# Patient Record
Sex: MALE | Race: WHITE | NOT HISPANIC OR LATINO | Employment: FULL TIME | URBAN - METROPOLITAN AREA
[De-identification: names, ages, dates, MRNs, and addresses within clinical notes are randomized per-mention and may not be internally consistent; named-entity substitution may affect disease eponyms.]

---

## 2017-11-09 ENCOUNTER — HISTORICAL (OUTPATIENT)
Dept: RADIOLOGY | Facility: HOSPITAL | Age: 52
End: 2017-11-09

## 2017-11-09 LAB
HBV SURFACE AG SERPL QL IA: NEGATIVE
HCV AB SERPL QL IA: NEGATIVE

## 2020-01-17 LAB — CRC RECOMMENDATION EXT: NORMAL

## 2021-09-03 LAB — TSH SERPL-ACNC: 1.24 MIU/ML (ref 0.45–4.5)

## 2021-10-28 LAB
BILIRUB SERPL-MCNC: NEGATIVE MG/DL
BLOOD URINE, POC: NEGATIVE
CLARITY, POC UA: CLEAR
COLOR, POC UA: YELLOW
GLUCOSE UR QL STRIP: NEGATIVE
KETONES UR QL STRIP: NEGATIVE
LEUKOCYTE EST, POC UA: NEGATIVE
NITRITE, POC UA: NEGATIVE
PH, POC UA: 7.5
PROTEIN, POC: NEGATIVE
SPECIFIC GRAVITY, POC UA: 1.01
UROBILINOGEN, POC UA: NORMAL

## 2022-04-12 ENCOUNTER — HISTORICAL (OUTPATIENT)
Dept: ADMINISTRATIVE | Facility: HOSPITAL | Age: 57
End: 2022-04-12
Payer: COMMERCIAL

## 2022-04-30 VITALS
SYSTOLIC BLOOD PRESSURE: 122 MMHG | BODY MASS INDEX: 26.02 KG/M2 | HEIGHT: 67 IN | DIASTOLIC BLOOD PRESSURE: 72 MMHG | OXYGEN SATURATION: 99 % | WEIGHT: 165.81 LBS

## 2022-05-09 ENCOUNTER — HISTORICAL (OUTPATIENT)
Dept: ADMINISTRATIVE | Facility: HOSPITAL | Age: 57
End: 2022-05-09
Payer: COMMERCIAL

## 2022-05-11 RX ORDER — ALPRAZOLAM 0.5 MG/1
0.25 TABLET ORAL DAILY PRN
Qty: 30 TABLET | Refills: 0 | Status: SHIPPED | OUTPATIENT
Start: 2022-05-11 | End: 2022-11-03 | Stop reason: SDUPTHER

## 2022-05-11 RX ORDER — ALPRAZOLAM 0.5 MG/1
0.25 TABLET ORAL DAILY PRN
COMMUNITY
End: 2022-05-11 | Stop reason: SDUPTHER

## 2022-05-11 NOTE — TELEPHONE ENCOUNTER
----- Message from Ino Crook sent at 5/11/2022  2:40 PM CDT -----  Type: RX Refill Request    Who Called: Patient    Refill or New Rx:Refill    RX Name and Strength:alprazolam 0.5    How is the patient currently taking it? (ex. 1XDay):PRN    Is this a 30 day or 90 day RX:90 days    Preferred Pharmacy with phone number:[facility] [address] [facility]    Local or Mail Order:local    Ordering Provider:Ann Marie    Would the patient rather a call back or a response via [facility]? call    Best Call Back Number:[phone]    Additional Information:

## 2022-08-16 ENCOUNTER — DOCUMENTATION ONLY (OUTPATIENT)
Dept: ADMINISTRATIVE | Facility: HOSPITAL | Age: 57
End: 2022-08-16
Payer: COMMERCIAL

## 2022-09-22 ENCOUNTER — HISTORICAL (OUTPATIENT)
Dept: ADMINISTRATIVE | Facility: HOSPITAL | Age: 57
End: 2022-09-22
Payer: COMMERCIAL

## 2022-10-13 ENCOUNTER — TELEPHONE (OUTPATIENT)
Dept: FAMILY MEDICINE | Facility: CLINIC | Age: 57
End: 2022-10-13
Payer: COMMERCIAL

## 2022-10-13 NOTE — TELEPHONE ENCOUNTER
Spoke with the patient about medication refill. Informed him that he should have over a month left due to it being a 90 day Rx and last fill should have been in August. Pt checked his bottle and states that he filled it on 8/27/22 and that it says 90, but that he only had 7 tablets left. Informed the patient that I would contact the pharmacy to see what happened.   Spoke with pharmacy tech at [facility] and she checked their cameras and notified me that they did short the patient and that they would contact him to notify him to go  the remaining Rx.   Attempted to call the pt to notify him. No answer. Left  informing his of this.

## 2022-10-13 NOTE — TELEPHONE ENCOUNTER
----- Message from Sia Joseph sent at 10/13/2022  4:30 PM CDT -----  Regarding: refill  Type:  RX Refill Request    Who Called: pt  Refill or New Rx:refill  RX Name and Strength:EScitalopram oxalate (LEXAPRO) 5 MG Tab  How is the patient currently taking it? (ex. 1XDay):1xday  Is this a 30 day or 90 day RX:90  Preferred Pharmacy with phone number:[facility]  Local or Mail Order:local  Ordering Provider:Elyse Jesus  Would the patient rather a call back or a response via [facility]?   Best Call Back Number:[phone]  Additional Information:

## 2022-10-26 ENCOUNTER — TELEPHONE (OUTPATIENT)
Dept: FAMILY MEDICINE | Facility: CLINIC | Age: 57
End: 2022-10-26
Payer: COMMERCIAL

## 2022-10-26 DIAGNOSIS — Z00.00 ANNUAL PHYSICAL EXAM: Primary | ICD-10-CM

## 2022-10-26 DIAGNOSIS — Z12.5 PROSTATE CANCER SCREENING: ICD-10-CM

## 2022-10-26 DIAGNOSIS — Z11.59 NEED FOR HEPATITIS C SCREENING TEST: ICD-10-CM

## 2022-10-26 DIAGNOSIS — Z11.4 SCREENING FOR HIV (HUMAN IMMUNODEFICIENCY VIRUS): ICD-10-CM

## 2022-11-03 ENCOUNTER — OFFICE VISIT (OUTPATIENT)
Dept: FAMILY MEDICINE | Facility: CLINIC | Age: 57
End: 2022-11-03
Payer: COMMERCIAL

## 2022-11-03 VITALS
DIASTOLIC BLOOD PRESSURE: 86 MMHG | HEIGHT: 67 IN | RESPIRATION RATE: 16 BRPM | TEMPERATURE: 98 F | OXYGEN SATURATION: 98 % | WEIGHT: 166.81 LBS | BODY MASS INDEX: 26.18 KG/M2 | HEART RATE: 78 BPM | SYSTOLIC BLOOD PRESSURE: 128 MMHG

## 2022-11-03 DIAGNOSIS — E78.5 HYPERLIPIDEMIA, UNSPECIFIED HYPERLIPIDEMIA TYPE: ICD-10-CM

## 2022-11-03 DIAGNOSIS — I27.21 SECONDARY PULMONARY ARTERIAL HYPERTENSION: ICD-10-CM

## 2022-11-03 DIAGNOSIS — E29.1 HYPOGONADISM IN MALE: ICD-10-CM

## 2022-11-03 DIAGNOSIS — Z12.5 PROSTATE CANCER SCREENING: ICD-10-CM

## 2022-11-03 DIAGNOSIS — I10 PRIMARY HYPERTENSION: ICD-10-CM

## 2022-11-03 DIAGNOSIS — F41.1 ANXIETY STATE: ICD-10-CM

## 2022-11-03 DIAGNOSIS — G44.219 EPISODIC TENSION-TYPE HEADACHE, NOT INTRACTABLE: ICD-10-CM

## 2022-11-03 DIAGNOSIS — Z00.00 ANNUAL PHYSICAL EXAM: Primary | ICD-10-CM

## 2022-11-03 PROBLEM — K21.9 GASTROESOPHAGEAL REFLUX DISEASE: Status: ACTIVE | Noted: 2022-11-03

## 2022-11-03 PROCEDURE — 3079F DIAST BP 80-89 MM HG: CPT | Mod: CPTII,,, | Performed by: NURSE PRACTITIONER

## 2022-11-03 PROCEDURE — 99396 PR PREVENTIVE VISIT,EST,40-64: ICD-10-PCS | Mod: ,,, | Performed by: NURSE PRACTITIONER

## 2022-11-03 PROCEDURE — 4010F ACE/ARB THERAPY RXD/TAKEN: CPT | Mod: CPTII,,, | Performed by: NURSE PRACTITIONER

## 2022-11-03 PROCEDURE — 3079F PR MOST RECENT DIASTOLIC BLOOD PRESSURE 80-89 MM HG: ICD-10-PCS | Mod: CPTII,,, | Performed by: NURSE PRACTITIONER

## 2022-11-03 PROCEDURE — 1160F PR REVIEW ALL MEDS BY PRESCRIBER/CLIN PHARMACIST DOCUMENTED: ICD-10-PCS | Mod: CPTII,,, | Performed by: NURSE PRACTITIONER

## 2022-11-03 PROCEDURE — 99396 PREV VISIT EST AGE 40-64: CPT | Mod: ,,, | Performed by: NURSE PRACTITIONER

## 2022-11-03 PROCEDURE — 3008F PR BODY MASS INDEX (BMI) DOCUMENTED: ICD-10-PCS | Mod: CPTII,,, | Performed by: NURSE PRACTITIONER

## 2022-11-03 PROCEDURE — 4010F PR ACE/ARB THEARPY RXD/TAKEN: ICD-10-PCS | Mod: CPTII,,, | Performed by: NURSE PRACTITIONER

## 2022-11-03 PROCEDURE — 3074F PR MOST RECENT SYSTOLIC BLOOD PRESSURE < 130 MM HG: ICD-10-PCS | Mod: CPTII,,, | Performed by: NURSE PRACTITIONER

## 2022-11-03 PROCEDURE — 3008F BODY MASS INDEX DOCD: CPT | Mod: CPTII,,, | Performed by: NURSE PRACTITIONER

## 2022-11-03 PROCEDURE — 1159F MED LIST DOCD IN RCRD: CPT | Mod: CPTII,,, | Performed by: NURSE PRACTITIONER

## 2022-11-03 PROCEDURE — 1160F RVW MEDS BY RX/DR IN RCRD: CPT | Mod: CPTII,,, | Performed by: NURSE PRACTITIONER

## 2022-11-03 PROCEDURE — 1159F PR MEDICATION LIST DOCUMENTED IN MEDICAL RECORD: ICD-10-PCS | Mod: CPTII,,, | Performed by: NURSE PRACTITIONER

## 2022-11-03 PROCEDURE — 3074F SYST BP LT 130 MM HG: CPT | Mod: CPTII,,, | Performed by: NURSE PRACTITIONER

## 2022-11-03 RX ORDER — TESTOSTERONE CYPIONATE 200 MG/ML
INJECTION, SOLUTION INTRAMUSCULAR
COMMUNITY
Start: 2022-10-11

## 2022-11-03 RX ORDER — ALPRAZOLAM 0.5 MG/1
0.25 TABLET ORAL DAILY PRN
Qty: 30 TABLET | Refills: 2 | Status: SHIPPED | OUTPATIENT
Start: 2022-11-03

## 2022-11-03 RX ORDER — ALBUTEROL SULFATE 90 UG/1
2 AEROSOL, METERED RESPIRATORY (INHALATION) EVERY 6 HOURS PRN
COMMUNITY

## 2022-11-03 RX ORDER — BUTALBITAL, ACETAMINOPHEN AND CAFFEINE 50; 325; 40 MG/1; MG/1; MG/1
TABLET ORAL
COMMUNITY
Start: 2021-11-22 | End: 2022-11-03 | Stop reason: SDUPTHER

## 2022-11-03 RX ORDER — LOSARTAN POTASSIUM 50 MG/1
50 TABLET ORAL DAILY
Qty: 90 TABLET | Refills: 3 | Status: SHIPPED | OUTPATIENT
Start: 2022-11-03 | End: 2023-05-03

## 2022-11-03 RX ORDER — BUTALBITAL, ACETAMINOPHEN AND CAFFEINE 50; 325; 40 MG/1; MG/1; MG/1
1 TABLET ORAL EVERY 6 HOURS PRN
Qty: 60 TABLET | Refills: 1 | Status: SHIPPED | OUTPATIENT
Start: 2022-11-03

## 2022-11-03 NOTE — PROGRESS NOTES
Subjective:       Patient ID: Kt Guzman is a 56 y.o. male.    Chief Complaint: Annual Exam      HPI   This is a 56-year-old white male who presents to clinic today for annual wellness exam.  Patient has a history of tension headaches, anxiety, hyperlipidemia, hypertension, secondary pulmonary arterial hypertension, male hypogonadism, GERD.  Patient states overall he is doing well.  Does admit some increased anxiety but wife states that on the days he feels anxious she notices that he forgot to take his Lexapro.  He takes his Lexapro lunchtime in his blood pressure medication in the evening because he thought he could not take them together.   Review of Systems  Comprehensive review of systems negative except as stated in HPI    The patient's Health Maintenance was reviewed and the following appears to be due:   Health Maintenance Due   Topic Date Due    PROSTATE-SPECIFIC ANTIGEN  Never done    HIV Screening  Never done    TETANUS VACCINE  Never done    Shingles Vaccine (1 of 2) Never done       Past Medical History:  Past Medical History:   Diagnosis Date    Anxiety disorder, unspecified     GERD (gastroesophageal reflux disease)     Hyperlipidemia     Hypertensive disorder     Male hypogonadism      Past Surgical History:   Procedure Laterality Date    COLONOSCOPY  2020    Dr. Hernandez    FACIAL RECONSTRUCTION SURGERY       Review of patient's allergies indicates:   Allergen Reactions    Phenytoin sodium extended Hives     Current Outpatient Medications on File Prior to Visit   Medication Sig Dispense Refill    albuterol (PROVENTIL/VENTOLIN HFA) 90 mcg/actuation inhaler Inhale 2 puffs into the lungs every 6 (six) hours as needed.      EScitalopram oxalate (LEXAPRO) 5 MG Tab TAKE 1 TABLET BY MOUTH EVERY DAY 90 tablet 1    testosterone cypionate (DEPOTESTOTERONE CYPIONATE) 200 mg/mL injection SMARTSI.5 Milliliter(s) IM Once a Week      [DISCONTINUED] ALPRAZolam (XANAX) 0.5 MG tablet Take 0.5 tablets (0.25  "mg total) by mouth daily as needed for Anxiety. 30 tablet 0    [DISCONTINUED] butalbital-acetaminophen-caffeine -40 mg (FIORICET, ESGIC) -40 mg per tablet Take by mouth.      [DISCONTINUED] losartan (COZAAR) 25 MG tablet TAKE 1 TABLET BY MOUTH EVERY DAY 90 tablet 1     No current facility-administered medications on file prior to visit.     Social History     Socioeconomic History    Marital status:    Tobacco Use    Smoking status: Never    Smokeless tobacco: Never   Substance and Sexual Activity    Alcohol use: Yes     Comment: occassional    Drug use: Never    Sexual activity: Yes     Partners: Female     Birth control/protection: None     Family History   Problem Relation Age of Onset    Heart block Mother     Heart block Father     Prostate cancer Father     Valvular heart disease Father        Objective:       /86 (BP Location: Left arm)   Pulse 78   Temp 98.3 °F (36.8 °C) (Oral)   Resp 16   Ht 5' 7" (1.702 m)   Wt 75.7 kg (166 lb 12.8 oz)   SpO2 98%   BMI 26.12 kg/m²      Physical Exam  Vitals and nursing note reviewed.   Constitutional:       Appearance: Normal appearance. He is normal weight.   HENT:      Head: Normocephalic and atraumatic.      Right Ear: Tympanic membrane, ear canal and external ear normal.      Left Ear: Tympanic membrane, ear canal and external ear normal.      Nose: Nose normal.      Mouth/Throat:      Mouth: Mucous membranes are moist.      Pharynx: Oropharynx is clear.   Eyes:      Extraocular Movements: Extraocular movements intact.      Conjunctiva/sclera: Conjunctivae normal.      Pupils: Pupils are equal, round, and reactive to light.   Cardiovascular:      Rate and Rhythm: Normal rate and regular rhythm.      Pulses: Normal pulses.      Heart sounds: Normal heart sounds.   Pulmonary:      Effort: Pulmonary effort is normal.      Breath sounds: Normal breath sounds.   Abdominal:      General: Abdomen is flat. Bowel sounds are normal.      " Palpations: Abdomen is soft.   Musculoskeletal:         General: Normal range of motion.      Cervical back: Normal range of motion and neck supple.   Skin:     General: Skin is warm and dry.   Neurological:      General: No focal deficit present.      Mental Status: He is alert and oriented to person, place, and time.   Psychiatric:         Mood and Affect: Mood normal.         Behavior: Behavior normal.         Thought Content: Thought content normal.         Judgment: Judgment normal.       Labs  Historical on 09/22/2022   Component Date Value Ref Range Status    Glucose, UA 10/28/2021 Negative   Final    Bilirubin, POC 10/28/2021 Negative   Final    Ketones, UA 10/28/2021 Negative   Final    Color, UA 10/28/2021 Yellow   Final    Clarity, UA 10/28/2021 Clear   Final    Spec Grav UA 10/28/2021 1.015   Final    Blood, UA 10/28/2021 Negative   Final    pH, UA 10/28/2021 7.5   Final    Protein, POC 10/28/2021 Negative   Final    Urobilinogen, UA 10/28/2021 0.2 mg/dl   Final    Nitrite, UA 10/28/2021 Negative   Final    Leukocytes, UA 10/28/2021 Negative   Final   Documentation Only on 08/16/2022   Component Date Value Ref Range Status    CRC Recommendation External 01/17/2020 Repeat colonoscopy in 5 years   Final   Historical on 05/09/2022   Component Date Value Ref Range Status    Thyroid Stimulating Hormone 09/03/2021 1.240  0.450 - 4.500 mIU/mL Final       Assessment and Plan       ICD-10-CM ICD-9-CM   1. Annual physical exam  Z00.00 V70.0   2. Prostate cancer screening  Z12.5 V76.44   3. Primary hypertension  I10 401.9   4. Anxiety state  F41.1 300.00   5. Secondary pulmonary arterial hypertension  I27.21 416.8   6. Episodic tension-type headache, not intractable  G44.219 339.11   7. Hyperlipidemia, unspecified hyperlipidemia type  E78.5 272.4   8. Hypogonadism in male  E29.1 257.2        1. Annual physical exam  Overview:  Annual exam yearly in November      2. Prostate cancer screening  Overview:  Followed by  Dr. Crook    Assessment & Plan:  Recent labs done with Dr. Crook, records requested      3. Primary hypertension  Overview:  Losartan 50 mg daily     Assessment & Plan:  Will increase losartan to 50 mg daily and follow-up in 6 months.    Orders:  -     losartan (COZAAR) 50 MG tablet; Take 1 tablet (50 mg total) by mouth once daily.  Dispense: 90 tablet; Refill: 3    4. Anxiety state  Overview:  Lexapro 5 mg daily  Xanax 0.5 mg 1/2-1 tablet daily as needed    Assessment & Plan:  Discussed maybe increasing his Lexapro to 10 mg daily but patient does think he has been forgetting to take his Lexapro some days.  He will start taking it at night with his blood pressure medication to increase compliance.  Will let me know if he wants to increase in a few weeks.    Orders:  -     ALPRAZolam (XANAX) 0.5 MG tablet; Take 0.5 tablets (0.25 mg total) by mouth daily as needed for Anxiety.  Dispense: 30 tablet; Refill: 2    5. Secondary pulmonary arterial hypertension  Overview:  Dr May    Assessment & Plan:  Saw Dr May in 2020, had echocardiogram. Records requested.       6. Episodic tension-type headache, not intractable  Overview:  Fioricet as needed    Assessment & Plan:  Stable, continue Fioricet, follow-up 6 months.    Orders:  -     butalbital-acetaminophen-caffeine -40 mg (FIORICET, ESGIC) -40 mg per tablet; Take 1 tablet by mouth every 6 (six) hours as needed for Headaches.  Dispense: 60 tablet; Refill: 1    7. Hyperlipidemia, unspecified hyperlipidemia type  Overview:  Diet controlled     Assessment & Plan:  Recent labs with Dr Crook, records requested      8. Hypogonadism in male  Overview:  Managed by Dr. Crook, on Depo testosterone 200 mg/ml 0.5 mL IM weekly    Assessment & Plan:  Stable, managed by Dr. Crook, recent records and labs requested.             Follow up in about 6 months (around 5/3/2023) for follow up.

## 2022-11-03 NOTE — ASSESSMENT & PLAN NOTE
Discussed maybe increasing his Lexapro to 10 mg daily but patient does think he has been forgetting to take his Lexapro some days.  He will start taking it at night with his blood pressure medication to increase compliance.  Will let me know if he wants to increase in a few weeks.

## 2023-02-06 PROBLEM — Z00.00 ANNUAL PHYSICAL EXAM: Status: RESOLVED | Noted: 2022-11-03 | Resolved: 2023-02-06

## 2023-04-26 ENCOUNTER — TELEPHONE (OUTPATIENT)
Dept: FAMILY MEDICINE | Facility: CLINIC | Age: 58
End: 2023-04-26
Payer: COMMERCIAL

## 2023-04-26 NOTE — LETTER
AUTHORIZATION FOR RELEASE OF   CONFIDENTIAL INFORMATION    Dear Dr. Crook,    We are seeing Kt Guzman, date of birth 1965, in the clinic at [facility]. MU Yung is the patient's PCP. Kt Guzman has an outstanding lab/procedure at the time we reviewed his chart. In order to help keep his health information updated, he has authorized us to request the following medical record(s):        (  )  MAMMOGRAM                                      (  )  COLONOSCOPY      (  )  PAP SMEAR                                          ( x )  OUTSIDE LAB RESULTS     (  )  DEXA SCAN                                          (  )  EYE EXAM            (  )  FOOT EXAM                                          (  )  ENTIRE RECORD     (  )  OUTSIDE IMMUNIZATIONS                 ( x )  MOST RECENT OFFICE NOTES       Please fax records to [facility], Kathryn F Duplantis, FNP, [phone]     If you have any questions, please contact [phone]           Patient Name: Kt Guzman  : 1965  Patient Phone #: [phone]      Dental

## 2023-04-26 NOTE — PROGRESS NOTES
Subjective:       Patient ID: Kt Guzman is a 57 y.o. male.    Chief Complaint: Hypertension (6 month f/u) and Headache (6 month f/u/Wakes up with headaches most morning.)      HPI   This is a 57-year-old white male who presents to clinic today for six-month follow-up for hypertension, anxiety, hyperlipidemia, male hypogonadism, secondary pulmonary arterial hypertension, and valvular heart disease.  Patient states overall he is doing well.  States that when he was taking the full dose of increase blood pressure medication he was waking up with a headache every day, started taking a half a tablet again and no longer has headache  Review of Systems  Comprehensive review of systems negative except as stated in HPI    The patient's Health Maintenance was reviewed and the following appears to be due:   Health Maintenance Due   Topic Date Due    PROSTATE-SPECIFIC ANTIGEN  Never done    Hemoglobin A1c (Diabetic Prevention Screening)  Never done       Past Medical History:  Past Medical History:   Diagnosis Date    Anxiety disorder, unspecified     GERD (gastroesophageal reflux disease)     Hyperlipidemia     Hypertensive disorder     Male hypogonadism      Past Surgical History:   Procedure Laterality Date    COLONOSCOPY  2020    Dr. Hernandez    FACIAL RECONSTRUCTION SURGERY       Review of patient's allergies indicates:   Allergen Reactions    Phenytoin sodium extended Hives     Current Outpatient Medications on File Prior to Visit   Medication Sig Dispense Refill    albuterol (PROVENTIL/VENTOLIN HFA) 90 mcg/actuation inhaler Inhale 2 puffs into the lungs every 6 (six) hours as needed.      butalbital-acetaminophen-caffeine -40 mg (FIORICET, ESGIC) -40 mg per tablet Take 1 tablet by mouth every 6 (six) hours as needed for Headaches. 60 tablet 1    testosterone cypionate (DEPOTESTOTERONE CYPIONATE) 200 mg/mL injection SMARTSI.5 Milliliter(s) IM Once a Week      [DISCONTINUED] EScitalopram oxalate  (LEXAPRO) 5 MG Tab TAKE 1 TABLET BY MOUTH EVERY DAY 90 tablet 1    [DISCONTINUED] losartan (COZAAR) 50 MG tablet Take 1 tablet (50 mg total) by mouth once daily. 90 tablet 3    ALPRAZolam (XANAX) 0.5 MG tablet Take 0.5 tablets (0.25 mg total) by mouth daily as needed for Anxiety. 30 tablet 2     No current facility-administered medications on file prior to visit.     Social History     Socioeconomic History    Marital status:    Tobacco Use    Smoking status: Never    Smokeless tobacco: Never   Substance and Sexual Activity    Alcohol use: Yes     Comment: occassional    Drug use: Never    Sexual activity: Yes     Partners: Female     Birth control/protection: None     Social Determinants of Health     Financial Resource Strain: Unknown    Difficulty of Paying Living Expenses: Patient refused   Food Insecurity: Unknown    Worried About Running Out of Food in the Last Year: Patient refused    Ran Out of Food in the Last Year: Patient refused   Transportation Needs: Unknown    Lack of Transportation (Medical): Patient refused    Lack of Transportation (Non-Medical): Patient refused   Physical Activity: Unknown    Days of Exercise per Week: Patient refused    Minutes of Exercise per Session: Patient refused   Stress: Unknown    Feeling of Stress : Patient refused   Social Connections: Unknown    Frequency of Communication with Friends and Family: Patient refused    Frequency of Social Gatherings with Friends and Family: Patient refused    Attends Sikh Services: Patient refused    Active Member of Clubs or Organizations: Patient refused    Attends Club or Organization Meetings: Patient refused    Marital Status: Patient refused   Housing Stability: Unknown    Unable to Pay for Housing in the Last Year: Patient refused    Unstable Housing in the Last Year: Patient refused     Family History   Problem Relation Age of Onset    Heart block Mother     Heart block Father     Prostate cancer Father     Valvular  "heart disease Father        Objective:       /88 (BP Location: Left arm)   Pulse 60   Temp 98.5 °F (36.9 °C) (Oral)   Resp 16   Ht 5' 7" (1.702 m)   Wt 77.8 kg (171 lb 9.6 oz)   SpO2 98%   BMI 26.88 kg/m²      Physical Exam  Vitals and nursing note reviewed.   Constitutional:       Appearance: Normal appearance. He is normal weight.   HENT:      Head: Normocephalic and atraumatic.      Right Ear: Tympanic membrane, ear canal and external ear normal.      Left Ear: Tympanic membrane, ear canal and external ear normal.      Nose: Nose normal.      Mouth/Throat:      Mouth: Mucous membranes are moist.      Pharynx: Oropharynx is clear.   Eyes:      Extraocular Movements: Extraocular movements intact.      Conjunctiva/sclera: Conjunctivae normal.      Pupils: Pupils are equal, round, and reactive to light.   Cardiovascular:      Rate and Rhythm: Normal rate and regular rhythm.      Pulses: Normal pulses.      Heart sounds: Normal heart sounds.   Pulmonary:      Effort: Pulmonary effort is normal.      Breath sounds: Normal breath sounds.   Musculoskeletal:         General: Normal range of motion.      Cervical back: Normal range of motion and neck supple.   Skin:     General: Skin is warm and dry.   Neurological:      General: No focal deficit present.      Mental Status: He is alert and oriented to person, place, and time.   Psychiatric:         Mood and Affect: Mood normal.         Behavior: Behavior normal.         Thought Content: Thought content normal.         Judgment: Judgment normal.           Assessment and Plan       ICD-10-CM ICD-9-CM   1. Primary hypertension  I10 401.9   2. Anxiety state  F41.1 300.00   3. Hypogonadism in male  E29.1 257.2   4. Annual physical exam  Z00.00 V70.0   5. Prostate cancer screening  Z12.5 V76.44   6. Secondary pulmonary arterial hypertension  I27.21 416.8   7. Hyperlipidemia, unspecified hyperlipidemia type  E78.5 272.4   8. Nonrheumatic aortic valve insufficiency  " I35.1 424.1   9. Nonrheumatic tricuspid valve regurgitation  I36.1 424.2        1. Primary hypertension  Overview:  10/28/2021 - amlodipine 5 mg daily  10/29/2021 - DC amlodipine due to dizziness, start ramipril 5 mg daily  01/31/2022 - DC ramipril due to cough, start losartan 25 mg daily  11/03/2022 - losartan 50 mg daily  05/02/2023 - decrease losartan back to 25 mg due to headache, add HCTZ 12.5 mg daily      Assessment & Plan:  Blood pressure still a little high in clinic today, diastolic in the 80s and 90s at home as well.  Patient reports he is only taking a half of the losartan because when he takes a whole he has a headache in the morning.  Will decrease losartan back to 25 milligrams daily and add HCTZ 12.5 milligrams daily.  Instructed to continue to monitor blood pressure at home, follow-up 6 months.    Orders:  -     hydroCHLOROthiazide (HYDRODIURIL) 12.5 MG Tab; Take 1 tablet (12.5 mg total) by mouth once daily.  Dispense: 90 tablet; Refill: 3  -     losartan (COZAAR) 25 MG tablet; Take 1 tablet (25 mg total) by mouth once daily.  Dispense: 90 tablet; Refill: 3    2. Anxiety state  Overview:  Lexapro 5 mg daily  Xanax 0.5 mg 1/2-1 tablet daily as needed    Assessment & Plan:  Stable, continue Lexapro 5 mg daily and Xanax as needed.  Follow-up 6 months with wellness.    Orders:  -     EScitalopram oxalate (LEXAPRO) 5 MG Tab; Take 1 tablet (5 mg total) by mouth once daily.  Dispense: 90 tablet; Refill: 3    3. Hypogonadism in male  Overview:  Managed by Dr. Crook, on Depo testosterone 200 mg/ml 0.5 mL IM weekly    Assessment & Plan:  Most recent labs and office visit note requested.  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      4. Annual physical exam  Overview:  Annual exam yearly in November    Orders:  -     CBC Auto Differential; Future; Expected date: 11/03/2023  -     Comprehensive Metabolic Panel; Future; Expected date: 11/03/2023  -     Lipid Panel; Future; Expected date:  11/03/2023  -     TSH; Future; Expected date: 11/03/2023  -     Hemoglobin A1C ([facility] ONLY); Future; Expected date: 11/03/2023    5. Prostate cancer screening  Overview:  Followed by Dr. Crook    Assessment & Plan:  Most recent labs requested      6. Secondary pulmonary arterial hypertension  Overview:  Dr May    Assessment & Plan:  Encouraged patient to call Dr. May for follow-up.      7. Hyperlipidemia, unspecified hyperlipidemia type  Overview:  Diet controlled     Assessment & Plan:  Lipid panel in 6 months with wellness.      8. Nonrheumatic aortic valve insufficiency  Overview:  Dr May     Assessment & Plan:  Last office visit January 2020, strongly encouraged patient to follow back up with Dr. May, patient states he will call his office.      9. Nonrheumatic tricuspid valve regurgitation  Overview:  Dr May    Assessment & Plan:  Last office visit January 2020, strongly encouraged patient to follow back up with Dr. May, patient states he will call his office.             Follow up in 6 months (on 11/9/2023) for Annual.

## 2023-04-26 NOTE — TELEPHONE ENCOUNTER
Are there any outstanding tasks in patient's chart?  labs    2. Do we have outstanding/pending referrals?  n    3. Has the patient been seen in an ER, Urgent Care, or admitted since last visit?  n    4. Has patient seen any other health care providers since last visit?  n    5.  Has patient had any blood work or x-rays done since last visit?   N    No labs needed for visit- will complete at [facility]    Release sent to Dr. Crook

## 2023-05-03 ENCOUNTER — OFFICE VISIT (OUTPATIENT)
Dept: FAMILY MEDICINE | Facility: CLINIC | Age: 58
End: 2023-05-03
Payer: COMMERCIAL

## 2023-05-03 VITALS
HEIGHT: 67 IN | WEIGHT: 171.63 LBS | RESPIRATION RATE: 16 BRPM | TEMPERATURE: 99 F | HEART RATE: 60 BPM | SYSTOLIC BLOOD PRESSURE: 132 MMHG | BODY MASS INDEX: 26.94 KG/M2 | DIASTOLIC BLOOD PRESSURE: 88 MMHG | OXYGEN SATURATION: 98 %

## 2023-05-03 DIAGNOSIS — E29.1 HYPOGONADISM IN MALE: ICD-10-CM

## 2023-05-03 DIAGNOSIS — E78.5 HYPERLIPIDEMIA, UNSPECIFIED HYPERLIPIDEMIA TYPE: ICD-10-CM

## 2023-05-03 DIAGNOSIS — I10 PRIMARY HYPERTENSION: Primary | ICD-10-CM

## 2023-05-03 DIAGNOSIS — I27.21 SECONDARY PULMONARY ARTERIAL HYPERTENSION: ICD-10-CM

## 2023-05-03 DIAGNOSIS — F41.1 ANXIETY STATE: ICD-10-CM

## 2023-05-03 DIAGNOSIS — Z00.00 ANNUAL PHYSICAL EXAM: ICD-10-CM

## 2023-05-03 DIAGNOSIS — I35.1 NONRHEUMATIC AORTIC VALVE INSUFFICIENCY: ICD-10-CM

## 2023-05-03 DIAGNOSIS — I36.1 NONRHEUMATIC TRICUSPID VALVE REGURGITATION: ICD-10-CM

## 2023-05-03 DIAGNOSIS — Z12.5 PROSTATE CANCER SCREENING: ICD-10-CM

## 2023-05-03 PROCEDURE — 3079F PR MOST RECENT DIASTOLIC BLOOD PRESSURE 80-89 MM HG: ICD-10-PCS | Mod: CPTII,,, | Performed by: NURSE PRACTITIONER

## 2023-05-03 PROCEDURE — 3008F PR BODY MASS INDEX (BMI) DOCUMENTED: ICD-10-PCS | Mod: CPTII,,, | Performed by: NURSE PRACTITIONER

## 2023-05-03 PROCEDURE — 3075F PR MOST RECENT SYSTOLIC BLOOD PRESS GE 130-139MM HG: ICD-10-PCS | Mod: CPTII,,, | Performed by: NURSE PRACTITIONER

## 2023-05-03 PROCEDURE — 1159F PR MEDICATION LIST DOCUMENTED IN MEDICAL RECORD: ICD-10-PCS | Mod: CPTII,,, | Performed by: NURSE PRACTITIONER

## 2023-05-03 PROCEDURE — 4010F ACE/ARB THERAPY RXD/TAKEN: CPT | Mod: CPTII,,, | Performed by: NURSE PRACTITIONER

## 2023-05-03 PROCEDURE — 4010F PR ACE/ARB THEARPY RXD/TAKEN: ICD-10-PCS | Mod: CPTII,,, | Performed by: NURSE PRACTITIONER

## 2023-05-03 PROCEDURE — 3008F BODY MASS INDEX DOCD: CPT | Mod: CPTII,,, | Performed by: NURSE PRACTITIONER

## 2023-05-03 PROCEDURE — 99214 OFFICE O/P EST MOD 30 MIN: CPT | Mod: ,,, | Performed by: NURSE PRACTITIONER

## 2023-05-03 PROCEDURE — 1160F RVW MEDS BY RX/DR IN RCRD: CPT | Mod: CPTII,,, | Performed by: NURSE PRACTITIONER

## 2023-05-03 PROCEDURE — 99214 PR OFFICE/OUTPT VISIT, EST, LEVL IV, 30-39 MIN: ICD-10-PCS | Mod: ,,, | Performed by: NURSE PRACTITIONER

## 2023-05-03 PROCEDURE — 1160F PR REVIEW ALL MEDS BY PRESCRIBER/CLIN PHARMACIST DOCUMENTED: ICD-10-PCS | Mod: CPTII,,, | Performed by: NURSE PRACTITIONER

## 2023-05-03 PROCEDURE — 1159F MED LIST DOCD IN RCRD: CPT | Mod: CPTII,,, | Performed by: NURSE PRACTITIONER

## 2023-05-03 PROCEDURE — 3079F DIAST BP 80-89 MM HG: CPT | Mod: CPTII,,, | Performed by: NURSE PRACTITIONER

## 2023-05-03 PROCEDURE — 3075F SYST BP GE 130 - 139MM HG: CPT | Mod: CPTII,,, | Performed by: NURSE PRACTITIONER

## 2023-05-03 RX ORDER — LOSARTAN POTASSIUM 25 MG/1
25 TABLET ORAL DAILY
Qty: 90 TABLET | Refills: 3 | Status: SHIPPED | OUTPATIENT
Start: 2023-05-03 | End: 2023-11-09 | Stop reason: DRUGHIGH

## 2023-05-03 RX ORDER — ESCITALOPRAM OXALATE 5 MG/1
5 TABLET ORAL DAILY
Qty: 90 TABLET | Refills: 3 | Status: SHIPPED | OUTPATIENT
Start: 2023-05-03

## 2023-05-03 RX ORDER — HYDROCHLOROTHIAZIDE 12.5 MG/1
12.5 TABLET ORAL DAILY
Qty: 90 TABLET | Refills: 3 | Status: SHIPPED | OUTPATIENT
Start: 2023-05-03 | End: 2024-05-02

## 2023-05-03 NOTE — ASSESSMENT & PLAN NOTE
Last office visit January 2020, strongly encouraged patient to follow back up with Dr. May, patient states he will call his office.

## 2023-05-03 NOTE — ASSESSMENT & PLAN NOTE
Most recent labs and office visit note requested.  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   Adequate: hears normal conversation without difficulty

## 2023-05-03 NOTE — LETTER
AUTHORIZATION FOR RELEASE OF   CONFIDENTIAL INFORMATION    Dear Dr. Crook,    We are seeing Kt Guzman, date of birth 1965, in the clinic at [facility]. MU Yung is the patient's PCP. Kt Guzman has an outstanding lab/procedure at the time we reviewed his chart. In order to help keep his health information updated, he has authorized us to request the following medical record(s):        (  )  MAMMOGRAM                                      (  )  COLONOSCOPY      (  )  PAP SMEAR                                          (X) OUTSIDE LAB RESULTS (PAST YEAR)     (  )  DEXA SCAN                                          (  )  EYE EXAM            (  )  FOOT EXAM                                          (  )  ENTIRE RECORD     (  )  OUTSIDE IMMUNIZATIONS                 (  )  _______________         Please fax records to [facility], Kathryn F Duplantis, FNP, [phone]     If you have any questions, please contact us at [phone].        Patient Name: Kt Guzman  : 1965  Patient Phone #: [phone]

## 2023-05-03 NOTE — ASSESSMENT & PLAN NOTE
Blood pressure still a little high in clinic today, diastolic in the 80s and 90s at home as well.  Patient reports he is only taking a half of the losartan because when he takes a whole he has a headache in the morning.  Will decrease losartan back to 25 milligrams daily and add HCTZ 12.5 milligrams daily.  Instructed to continue to monitor blood pressure at home, follow-up 6 months.

## 2023-05-04 ENCOUNTER — DOCUMENTATION ONLY (OUTPATIENT)
Dept: FAMILY MEDICINE | Facility: CLINIC | Age: 58
End: 2023-05-04
Payer: COMMERCIAL

## 2023-05-04 LAB
% NEUTROPHILS (OHS): 47 %
ALBUMIN SERPL BCP-MCNC: 4.2 G/DL (ref 3.8–4.9)
ALBUMIN/GLOB SERPL ELPH: 1.7 {RATIO} (ref 1.2–2.2)
ALP SERPL-CCNC: 45 U/L (ref 44–121)
ALT SERPL W P-5'-P-CCNC: 21 U/L (ref 0–44)
AST SERPL-CCNC: 17 U/L (ref 0–40)
BASOPHILS NFR BLD: 0 % (ref 0–3)
BASOPHILS NFR BLD: 0 K/UL (ref 0–0.2)
BILIRUB SERPL-MCNC: 0.4 MG/DL (ref 0–1.2)
BUN BLD-MCNC: 19 MG/DL (ref 6–24)
BUN/CREAT RATIO: 19 (ref 9–20)
CALCIUM SERPL-MCNC: 9 MG/DL (ref 8.7–10.2)
CHLORIDE: 102 MMOL/L (ref 96–106)
CHOLEST SERPL-MSCNC: 169 MG/DL (ref 100–199)
CO2 SERPL-SCNC: 22 MMOL/L (ref 20–29)
CREAT SERPL-MCNC: 1 MG/DL (ref 0.8–1.3)
EGFR: 85 ML/MIN/1.73 (ref 59–?)
EOSINOPHIL NFR BLD: 0.1 K/UL (ref 0–0.4)
EOSINOPHIL NFR BLD: 2 %
GLOBULIN SER CALC-MCNC: 2.5 G/DL (ref 1.5–4.5)
GLUCOSE: 99 MG/DL (ref 70–99)
HCT VFR BLD AUTO: 47.6 % (ref 37.5–51)
HDLC SERPL-MCNC: 25 MG/DL (ref ?–39)
HGB BLD-MCNC: 16.1 G/DL (ref 13–17.7)
IMM GRANULOCYTES NFR BLD AUTO: 0 %
LDLC SERPL CALC-MCNC: 106 MG/DL (ref 0–99)
LYMPH #: 3.1 K/UL (ref 0.7–3.1)
LYMPH%: 42 % (ref 18–52)
MCH RBC QN AUTO: 31.4 PG (ref 26.6–33)
MCHC RBC AUTO-ENTMCNC: 33.8 G/DL (ref 31.5–35.7)
MCV RBC AUTO: 93 FL (ref 79–97)
MONO #: 0.7 K/UL (ref 0.1–0.9)
MONO%: 9 % (ref 3–10)
NEUTROPHILS - ABS (DIFF): 3 /ΜL (ref 1–7)
PLATELET # BLD AUTO: 204 K/UL (ref 150–450)
POTASSIUM: 5.1 MMOL/L (ref 3.5–5.2)
PSA SERPL-MCNC: 0.7 NG/ML (ref 0–4)
RBC # BLD AUTO: 5.12 M/UL (ref 4.14–5.8)
RDW-CV: 11.8 % (ref 11.6–15.4)
SODIUM: 140 MMOL/L (ref 134–144)
TESTOST SERPL-MCNC: 426 NG/DL (ref 264–916)
TOTAL PROTEIN: 6.7 G/DL (ref 6–8.5)
TRIGL SERPL-MCNC: 215 MG/DL (ref 0–149)
VLDLC SERPL-MCNC: 38 MG/DL (ref 5–40)
WBC: 7.4 K/UL (ref 3.4–10.8)

## 2023-08-07 PROBLEM — Z00.00 ANNUAL PHYSICAL EXAM: Status: RESOLVED | Noted: 2022-11-03 | Resolved: 2023-08-07

## 2023-10-18 PROBLEM — D75.1 SECONDARY POLYCYTHEMIA: Status: ACTIVE | Noted: 2023-10-18

## 2023-11-02 ENCOUNTER — TELEPHONE (OUTPATIENT)
Dept: FAMILY MEDICINE | Facility: CLINIC | Age: 58
End: 2023-11-02
Payer: COMMERCIAL

## 2023-11-02 DIAGNOSIS — Z00.00 ANNUAL PHYSICAL EXAM: Primary | ICD-10-CM

## 2023-11-02 NOTE — LETTER
AUTHORIZATION FOR RELEASE OF   CONFIDENTIAL INFORMATION    Dear Dr. May,    We are seeing Kt Guzman, date of birth 1965, in the clinic at [facility]. Elyse Jesus FNP is the patient's PCP. Kt Guzman has an outstanding lab/procedure at the time we reviewed his chart. In order to help keep his health information updated, he has authorized us to request the following medical record(s):        (  )  MAMMOGRAM                                      (  )  COLONOSCOPY      (  )  PAP SMEAR                                          (  )  OUTSIDE LAB RESULTS     (  )  DEXA SCAN                                          (  )  EYE EXAM            (  )  FOOT EXAM                                          (  )  ENTIRE RECORD     (  )  OUTSIDE IMMUNIZATIONS                 ( x )  MOST RECENT OFFICE NOTE     Please fax records to [facility], Duplantis, Kathryn F., MU, [phone]     If you have any questions, please contact [phone]           Patient Name: Kt Guzman  : 1965  Patient Phone #: [phone]

## 2023-11-02 NOTE — TELEPHONE ENCOUNTER
Are there any outstanding tasks in patient's chart?  labs    2. Do we have outstanding/pending referrals?  n    3. Has the patient been seen in an ER, Urgent Care, or admitted since last visit?  n    4. Has patient seen any other health care providers since last visit?  Dr. May    5.  Has patient had any blood work or x-rays done since last visit?    [facility]

## 2023-11-03 LAB
ALBUMIN SERPL-MCNC: 4.9 G/DL (ref 3.8–4.9)
ALBUMIN/GLOB SERPL: 1.8 {RATIO} (ref 1.2–2.2)
ALP SERPL-CCNC: 55 IU/L (ref 44–121)
ALT SERPL-CCNC: 23 IU/L (ref 0–44)
AST SERPL-CCNC: 20 IU/L (ref 0–40)
BASOPHILS # BLD AUTO: 0 X10E3/UL (ref 0–0.2)
BASOPHILS NFR BLD AUTO: 0 %
BILIRUB SERPL-MCNC: 0.7 MG/DL (ref 0–1.2)
BUN SERPL-MCNC: 23 MG/DL (ref 6–24)
BUN/CREAT SERPL: 20 (ref 9–20)
CALCIUM SERPL-MCNC: 9.9 MG/DL (ref 8.7–10.2)
CHLORIDE SERPL-SCNC: 97 MMOL/L (ref 96–106)
CHOLEST SERPL-MCNC: 225 MG/DL (ref 100–199)
CO2 SERPL-SCNC: 25 MMOL/L (ref 20–29)
CREAT SERPL-MCNC: 1.16 MG/DL (ref 0.76–1.27)
EOSINOPHIL # BLD AUTO: 0.1 X10E3/UL (ref 0–0.4)
EOSINOPHIL NFR BLD AUTO: 2 %
ERYTHROCYTE [DISTWIDTH] IN BLOOD BY AUTOMATED COUNT: 13 % (ref 11.6–15.4)
EST. GFR  (NO RACE VARIABLE): 73 ML/MIN/1.73
GLOBULIN SER CALC-MCNC: 2.8 G/DL (ref 1.5–4.5)
GLUCOSE SERPL-MCNC: 92 MG/DL (ref 70–99)
HBA1C MFR BLD: 5.2 % (ref 4.8–5.6)
HCT VFR BLD AUTO: 53.8 % (ref 37.5–51)
HDLC SERPL-MCNC: 31 MG/DL
HGB BLD-MCNC: 18.1 G/DL (ref 13–17.7)
IMM GRANULOCYTES NFR BLD AUTO: 0 %
LDLC SERPL CALC-MCNC: 157 MG/DL (ref 0–99)
LYMPHOCYTES # BLD AUTO: 2.4 X10E3/UL (ref 0.7–3.1)
LYMPHOCYTES NFR BLD AUTO: 34 %
MCH RBC QN AUTO: 33 PG (ref 26.6–33)
MCHC RBC AUTO-ENTMCNC: 33.6 G/DL (ref 31.5–35.7)
MCV RBC AUTO: 98 FL (ref 79–97)
MONOCYTES # BLD AUTO: 0.7 X10E3/UL (ref 0.1–0.9)
MONOCYTES NFR BLD AUTO: 10 %
NEUTROPHILS # BLD AUTO: 3.9 X10E3/UL (ref 1.4–7)
NEUTROPHILS NFR BLD AUTO: 54 %
PLATELET # BLD AUTO: 251 X10E3/UL (ref 150–450)
POTASSIUM SERPL-SCNC: 5.8 MMOL/L (ref 3.5–5.2)
PROT SERPL-MCNC: 7.7 G/DL (ref 6–8.5)
RBC # BLD AUTO: 5.48 X10E6/UL (ref 4.14–5.8)
SODIUM SERPL-SCNC: 137 MMOL/L (ref 134–144)
TRIGL SERPL-MCNC: 198 MG/DL (ref 0–149)
TSH SERPL DL<=0.005 MIU/L-ACNC: 1.88 UIU/ML (ref 0.45–4.5)
VLDLC SERPL CALC-MCNC: 37 MG/DL (ref 5–40)
WBC # BLD AUTO: 7.2 X10E3/UL (ref 3.4–10.8)

## 2023-11-06 DIAGNOSIS — E87.5 HYPERKALEMIA: Primary | ICD-10-CM

## 2023-11-06 NOTE — PROGRESS NOTES
Labs reviewed, potassium really elevated at 5.8. Needs to go back for redraw to confirm. Repeat potassium ordered for [facility].

## 2023-11-07 ENCOUNTER — TELEPHONE (OUTPATIENT)
Dept: FAMILY MEDICINE | Facility: CLINIC | Age: 58
End: 2023-11-07
Payer: COMMERCIAL

## 2023-11-07 LAB — POTASSIUM SERPL-SCNC: 5.2 MMOL/L (ref 3.5–5.2)

## 2023-11-07 NOTE — TELEPHONE ENCOUNTER
----- Message from MU Escobar sent at 11/6/2023  8:06 AM CST -----  Labs reviewed, potassium really elevated at 5.8. Needs to go back for redraw to confirm. Repeat potassium ordered for [facility].

## 2023-11-09 ENCOUNTER — PATIENT MESSAGE (OUTPATIENT)
Dept: FAMILY MEDICINE | Facility: CLINIC | Age: 58
End: 2023-11-09

## 2023-11-09 ENCOUNTER — OFFICE VISIT (OUTPATIENT)
Dept: FAMILY MEDICINE | Facility: CLINIC | Age: 58
End: 2023-11-09
Payer: COMMERCIAL

## 2023-11-09 VITALS
SYSTOLIC BLOOD PRESSURE: 148 MMHG | OXYGEN SATURATION: 97 % | DIASTOLIC BLOOD PRESSURE: 94 MMHG | TEMPERATURE: 98 F | RESPIRATION RATE: 16 BRPM | WEIGHT: 167.81 LBS | HEART RATE: 73 BPM | HEIGHT: 67 IN | BODY MASS INDEX: 26.34 KG/M2

## 2023-11-09 VITALS — DIASTOLIC BLOOD PRESSURE: 79 MMHG | SYSTOLIC BLOOD PRESSURE: 120 MMHG

## 2023-11-09 DIAGNOSIS — F41.1 ANXIETY STATE: ICD-10-CM

## 2023-11-09 DIAGNOSIS — I10 PRIMARY HYPERTENSION: ICD-10-CM

## 2023-11-09 DIAGNOSIS — Z12.5 PROSTATE CANCER SCREENING: ICD-10-CM

## 2023-11-09 DIAGNOSIS — Z00.00 ANNUAL PHYSICAL EXAM: Primary | ICD-10-CM

## 2023-11-09 DIAGNOSIS — E78.2 MODERATE MIXED HYPERLIPIDEMIA NOT REQUIRING STATIN THERAPY: ICD-10-CM

## 2023-11-09 DIAGNOSIS — E29.1 HYPOGONADISM IN MALE: ICD-10-CM

## 2023-11-09 DIAGNOSIS — D75.1 SECONDARY POLYCYTHEMIA: ICD-10-CM

## 2023-11-09 PROCEDURE — 3044F PR MOST RECENT HEMOGLOBIN A1C LEVEL <7.0%: ICD-10-PCS | Mod: CPTII,,, | Performed by: NURSE PRACTITIONER

## 2023-11-09 PROCEDURE — 1160F RVW MEDS BY RX/DR IN RCRD: CPT | Mod: CPTII,,, | Performed by: NURSE PRACTITIONER

## 2023-11-09 PROCEDURE — 1160F PR REVIEW ALL MEDS BY PRESCRIBER/CLIN PHARMACIST DOCUMENTED: ICD-10-PCS | Mod: CPTII,,, | Performed by: NURSE PRACTITIONER

## 2023-11-09 PROCEDURE — 99396 PR PREVENTIVE VISIT,EST,40-64: ICD-10-PCS | Mod: ,,, | Performed by: NURSE PRACTITIONER

## 2023-11-09 PROCEDURE — 1159F MED LIST DOCD IN RCRD: CPT | Mod: CPTII,,, | Performed by: NURSE PRACTITIONER

## 2023-11-09 PROCEDURE — 4010F PR ACE/ARB THEARPY RXD/TAKEN: ICD-10-PCS | Mod: CPTII,,, | Performed by: NURSE PRACTITIONER

## 2023-11-09 PROCEDURE — 3077F PR MOST RECENT SYSTOLIC BLOOD PRESSURE >= 140 MM HG: ICD-10-PCS | Mod: CPTII,,, | Performed by: NURSE PRACTITIONER

## 2023-11-09 PROCEDURE — 1159F PR MEDICATION LIST DOCUMENTED IN MEDICAL RECORD: ICD-10-PCS | Mod: CPTII,,, | Performed by: NURSE PRACTITIONER

## 2023-11-09 PROCEDURE — 3080F DIAST BP >= 90 MM HG: CPT | Mod: CPTII,,, | Performed by: NURSE PRACTITIONER

## 2023-11-09 PROCEDURE — 3008F BODY MASS INDEX DOCD: CPT | Mod: CPTII,,, | Performed by: NURSE PRACTITIONER

## 2023-11-09 PROCEDURE — 3044F HG A1C LEVEL LT 7.0%: CPT | Mod: CPTII,,, | Performed by: NURSE PRACTITIONER

## 2023-11-09 PROCEDURE — 3080F PR MOST RECENT DIASTOLIC BLOOD PRESSURE >= 90 MM HG: ICD-10-PCS | Mod: CPTII,,, | Performed by: NURSE PRACTITIONER

## 2023-11-09 PROCEDURE — 99396 PREV VISIT EST AGE 40-64: CPT | Mod: ,,, | Performed by: NURSE PRACTITIONER

## 2023-11-09 PROCEDURE — 4010F ACE/ARB THERAPY RXD/TAKEN: CPT | Mod: CPTII,,, | Performed by: NURSE PRACTITIONER

## 2023-11-09 PROCEDURE — 3008F PR BODY MASS INDEX (BMI) DOCUMENTED: ICD-10-PCS | Mod: CPTII,,, | Performed by: NURSE PRACTITIONER

## 2023-11-09 PROCEDURE — 3077F SYST BP >= 140 MM HG: CPT | Mod: CPTII,,, | Performed by: NURSE PRACTITIONER

## 2023-11-09 RX ORDER — LOSARTAN POTASSIUM 50 MG/1
50 TABLET ORAL
COMMUNITY
Start: 2023-10-20 | End: 2024-03-07

## 2023-11-09 NOTE — PROGRESS NOTES
Subjective:       Patient ID: Kt Guzman is a 57 y.o. male.    Chief Complaint: Annual Exam      HPI   This is a 57-year-old white male who presents to clinic today for an annual wellness exam.  Patient reports overall he is doing well.  No complaints today.  Review of Systems  Comprehensive review of systems negative except as stated in HPI    The patient's Health Maintenance was reviewed and the following appears to be due:   There are no preventive care reminders to display for this patient.    Past Medical History:  Past Medical History:   Diagnosis Date    Anxiety disorder, unspecified     GERD (gastroesophageal reflux disease)     Hyperlipidemia     Hypertensive disorder     Male hypogonadism      Past Surgical History:   Procedure Laterality Date    COLONOSCOPY  2020    Dr. Hernandez    FACIAL RECONSTRUCTION SURGERY       Review of patient's allergies indicates:   Allergen Reactions    Phenytoin sodium extended Hives     Current Outpatient Medications on File Prior to Visit   Medication Sig Dispense Refill    albuterol (PROVENTIL/VENTOLIN HFA) 90 mcg/actuation inhaler Inhale 2 puffs into the lungs every 6 (six) hours as needed.      ALPRAZolam (XANAX) 0.5 MG tablet Take 0.5 tablets (0.25 mg total) by mouth daily as needed for Anxiety. 30 tablet 2    butalbital-acetaminophen-caffeine -40 mg (FIORICET, ESGIC) -40 mg per tablet Take 1 tablet by mouth every 6 (six) hours as needed for Headaches. 60 tablet 1    EScitalopram oxalate (LEXAPRO) 5 MG Tab Take 1 tablet (5 mg total) by mouth once daily. 90 tablet 3    hydroCHLOROthiazide (HYDRODIURIL) 12.5 MG Tab Take 1 tablet (12.5 mg total) by mouth once daily. 90 tablet 3    losartan (COZAAR) 50 MG tablet Take 50 mg by mouth.      testosterone cypionate (DEPOTESTOTERONE CYPIONATE) 200 mg/mL injection SMARTSI.5 Milliliter(s) IM Once a Week      [DISCONTINUED] losartan (COZAAR) 25 MG tablet Take 1 tablet (25 mg total) by mouth once daily. 90  tablet 3     No current facility-administered medications on file prior to visit.     Social History     Socioeconomic History    Marital status:    Tobacco Use    Smoking status: Never    Smokeless tobacco: Never   Substance and Sexual Activity    Alcohol use: Yes     Comment: occassional    Drug use: Never    Sexual activity: Yes     Partners: Female     Birth control/protection: None     Social Determinants of Health     Financial Resource Strain: Unknown (5/3/2023)    Overall Financial Resource Strain (CARDIA)     Difficulty of Paying Living Expenses: Patient refused   Food Insecurity: Unknown (5/3/2023)    Hunger Vital Sign     Worried About Running Out of Food in the Last Year: Patient refused     Ran Out of Food in the Last Year: Patient refused   Transportation Needs: Unknown (5/3/2023)    PRAPARE - Transportation     Lack of Transportation (Medical): Patient refused     Lack of Transportation (Non-Medical): Patient refused   Physical Activity: Unknown (5/3/2023)    Exercise Vital Sign     Days of Exercise per Week: Patient refused     Minutes of Exercise per Session: Patient refused   Stress: Unknown (5/3/2023)    [employer] - Occupational Stress Questionnaire     Feeling of Stress : Patient refused   Social Connections: Unknown (5/3/2023)    Social Connection and Isolation Panel [NHANES]     Frequency of Communication with Friends and Family: Patient refused     Frequency of Social Gatherings with Friends and Family: Patient refused     Attends Jainism Services: Patient refused     Active Member of Clubs or Organizations: Patient refused     Attends Club or Organization Meetings: Patient refused     Marital Status: Patient refused   Housing Stability: Unknown (5/3/2023)    Housing Stability Vital Sign     Unable to Pay for Housing in the Last Year: Patient refused     Unstable Housing in the Last Year: Patient refused     Family History   Problem Relation Age of Onset  "   Heart block Mother     Heart block Father     Prostate cancer Father     Valvular heart disease Father        Objective:       BP (!) 148/94 (BP Location: Left arm)   Pulse 73   Temp 98.1 °F (36.7 °C) (Temporal)   Resp 16   Ht 5' 7" (1.702 m)   Wt 76.1 kg (167 lb 12.8 oz)   SpO2 97%   BMI 26.28 kg/m²      Physical Exam  Vitals and nursing note reviewed.   Constitutional:       Appearance: Normal appearance. He is normal weight.   HENT:      Head: Normocephalic and atraumatic.      Right Ear: Tympanic membrane, ear canal and external ear normal.      Left Ear: Tympanic membrane, ear canal and external ear normal.      Nose: Nose normal.      Mouth/Throat:      Mouth: Mucous membranes are moist.      Pharynx: Oropharynx is clear.   Eyes:      Extraocular Movements: Extraocular movements intact.      Conjunctiva/sclera: Conjunctivae normal.      Pupils: Pupils are equal, round, and reactive to light.   Cardiovascular:      Rate and Rhythm: Normal rate and regular rhythm.      Pulses: Normal pulses.      Heart sounds: Normal heart sounds.   Pulmonary:      Effort: Pulmonary effort is normal.      Breath sounds: Normal breath sounds.   Abdominal:      General: Abdomen is flat. Bowel sounds are normal.      Palpations: Abdomen is soft.   Musculoskeletal:         General: Normal range of motion.      Cervical back: Normal range of motion and neck supple.   Skin:     General: Skin is warm and dry.   Neurological:      General: No focal deficit present.      Mental Status: He is alert and oriented to person, place, and time.   Psychiatric:         Mood and Affect: Mood normal.         Behavior: Behavior normal.         Thought Content: Thought content normal.         Judgment: Judgment normal.         Labs  Orders Only on 11/06/2023   Component Date Value Ref Range Status    Potassium 11/07/2023 5.2  3.5 - 5.2 mmol/L Final   Telephone on 11/02/2023   Component Date Value Ref Range Status    WBC 11/03/2023 7.2  3.4 " - 10.8 x10E3/uL Final    RBC 11/03/2023 5.48  4.14 - 5.80 x10E6/uL Final    Hemoglobin 11/03/2023 18.1 (H)  13.0 - 17.7 g/dL Final    Hematocrit 11/03/2023 53.8 (H)  37.5 - 51.0 % Final    MCV 11/03/2023 98 (H)  79 - 97 fL Final    MCH 11/03/2023 33.0  26.6 - 33.0 pg Final    MCHC 11/03/2023 33.6  31.5 - 35.7 g/dL Final    RDW 11/03/2023 13.0  11.6 - 15.4 % Final    Platelets 11/03/2023 251  150 - 450 x10E3/uL Final    Neutrophils 11/03/2023 54  Not Estab. % Final    Lymphs 11/03/2023 34  Not Estab. % Final    Monocytes 11/03/2023 10  Not Estab. % Final    Eos 11/03/2023 2  Not Estab. % Final    Basos 11/03/2023 0  Not Estab. % Final    Neutrophils (Absolute) 11/03/2023 3.9  1.4 - 7.0 x10E3/uL Final    Lymphs (Absolute) 11/03/2023 2.4  0.7 - 3.1 x10E3/uL Final    Monocytes(Absolute) 11/03/2023 0.7  0.1 - 0.9 x10E3/uL Final    Eos (Absolute) 11/03/2023 0.1  0.0 - 0.4 x10E3/uL Final    Baso (Absolute) 11/03/2023 0.0  0.0 - 0.2 x10E3/uL Final    Immature Granulocytes 11/03/2023 0  Not Estab. % Final    Glucose 11/03/2023 92  70 - 99 mg/dL Final    BUN 11/03/2023 23  6 - 24 mg/dL Final    Creatinine 11/03/2023 1.16  0.76 - 1.27 mg/dL Final    eGFR 11/03/2023 73  >59 mL/min/1.73 Final    BUN/Creatinine Ratio 11/03/2023 20  9 - 20 Final    Sodium 11/03/2023 137  134 - 144 mmol/L Final    Potassium 11/03/2023 5.8 (H)  3.5 - 5.2 mmol/L Final    Chloride 11/03/2023 97  96 - 106 mmol/L Final    CO2 11/03/2023 25  20 - 29 mmol/L Final    Calcium 11/03/2023 9.9  8.7 - 10.2 mg/dL Final    Protein, Total 11/03/2023 7.7  6.0 - 8.5 g/dL Final    Albumin 11/03/2023 4.9  3.8 - 4.9 g/dL Final    Globulin, Total 11/03/2023 2.8  1.5 - 4.5 g/dL Final    Albumin/Globulin Ratio 11/03/2023 1.8  1.2 - 2.2 Final    Total Bilirubin 11/03/2023 0.7  0.0 - 1.2 mg/dL Final    Alkaline Phosphatase 11/03/2023 55  44 - 121 IU/L Final    AST 11/03/2023 20  0 - 40 IU/L Final    ALT 11/03/2023 23  0 - 44 IU/L Final    Cholesterol 11/03/2023 225 (H)  100  - 199 mg/dL Final    Triglycerides 11/03/2023 198 (H)  0 - 149 mg/dL Final    HDL 11/03/2023 31 (L)  >39 mg/dL Final    VLDL Cholesterol Ralph 11/03/2023 37  5 - 40 mg/dL Final    LDL Calculated 11/03/2023 157 (H)  0 - 99 mg/dL Final    TSH 11/03/2023 1.880  0.450 - 4.500 uIU/mL Final    Hemoglobin A1c 11/03/2023 5.2  4.8 - 5.6 % Final    Comment:          Prediabetes: 5.7 - 6.4           Diabetes: >6.4           Glycemic control for adults with diabetes: <7.0         Assessment and Plan       ICD-10-CM ICD-9-CM   1. Annual physical exam  Z00.00 V70.0   2. Hypogonadism in male  E29.1 257.2   3. Secondary polycythemia  D75.1 289.0   4. Prostate cancer screening  Z12.5 V76.44   5. Moderate mixed hyperlipidemia not requiring statin therapy  E78.2 272.2   6. Primary hypertension  I10 401.9   7. Anxiety state  F41.1 300.00        1. Annual physical exam  Overview:  Annual exam yearly in November      2. Hypogonadism in male  Overview:  Managed by Dr. Crook, on Depo testosterone 200 mg/ml 0.5 mL IM weekly    Assessment & Plan:  Patient today with elevated blood pressure, elevated lipids, polycythemia.  Discussed that these can all be side effects of his testosterone therapy.  Instructed to discuss other options with Urology.      3. Secondary polycythemia  Overview:  Due to TRT  Therapeutic phlebotomy at [facility]    Assessment & Plan:  Hemoglobin 18.1, hematocrit 53.8.  Long discussion today with patient about secondary polycythemia and risks associated with it.  Instructed to do therapeutic phlebotomy at least monthly.  Follow-up 6 months.    Orders:  -     CBC Auto Differential; Future; Expected date: 05/09/2024    4. Prostate cancer screening  Overview:  Followed by Dr. Crook    Assessment & Plan:  PSA with Dr. Crook 0.70 in May.      5. Moderate mixed hyperlipidemia not requiring statin therapy  Overview:  Diet controlled     Assessment & Plan:  Total cholesterol 225, HDL 31, triglycerides 198, .   Instructed to speak to Urology about getting off of his testosterone or switching over to pellets.  Instructed to modified diet and increased exercise.  Will recheck in 6 months.    Orders:  -     Comprehensive Metabolic Panel; Future; Expected date: 05/09/2024  -     Lipid Panel; Future; Expected date: 05/09/2024    6. Primary hypertension  Overview:  10/28/2021 - amlodipine 5 mg daily  10/29/2021 - DC amlodipine due to dizziness, start ramipril 5 mg daily  01/31/2022 - DC ramipril due to cough, start losartan 25 mg daily  11/03/2022 - losartan 50 mg daily  05/02/2023 - decrease losartan back to 25 mg due to headache, add HCTZ 12.5 mg daily      Assessment & Plan:  Elevated x2 in clinic today, reports normal most of the time at home.  Instructed to continue to monitor home blood pressure, will talk to Urology about decreasing his testosterone.  Will send [employer] message in 3 weeks for home blood pressure.      7. Anxiety state  Overview:  Lexapro 5 mg daily  Xanax 0.5 mg 1/2-1 tablet daily as needed    Assessment & Plan:  Stable, continue Lexapro daily and Xanax as needed.  Follow-up 6 months.             Follow up in about 6 months (around 5/9/2024) for follow up.

## 2023-11-09 NOTE — ASSESSMENT & PLAN NOTE
Total cholesterol 225, HDL 31, triglycerides 198, .  Instructed to speak to Urology about getting off of his testosterone or switching over to pellets.  Instructed to modified diet and increased exercise.  Will recheck in 6 months.

## 2023-11-09 NOTE — ASSESSMENT & PLAN NOTE
Hemoglobin 18.1, hematocrit 53.8.  Long discussion today with patient about secondary polycythemia and risks associated with it.  Instructed to do therapeutic phlebotomy at least monthly.  Follow-up 6 months.

## 2023-11-09 NOTE — ASSESSMENT & PLAN NOTE
Patient today with elevated blood pressure, elevated lipids, polycythemia.  Discussed that these can all be side effects of his testosterone therapy.  Instructed to discuss other options with Urology.

## 2023-12-07 ENCOUNTER — TELEPHONE (OUTPATIENT)
Dept: FAMILY MEDICINE | Facility: CLINIC | Age: 58
End: 2023-12-07
Payer: COMMERCIAL

## 2023-12-07 NOTE — TELEPHONE ENCOUNTER
Patient did not respond to [employer] message for home blood pressure, can you please call him for home blood pressure follow-up

## 2024-02-12 PROBLEM — Z00.00 ANNUAL PHYSICAL EXAM: Status: RESOLVED | Noted: 2022-11-03 | Resolved: 2024-02-12

## 2024-03-07 ENCOUNTER — TELEPHONE (OUTPATIENT)
Dept: FAMILY MEDICINE | Facility: CLINIC | Age: 59
End: 2024-03-07
Payer: COMMERCIAL

## 2024-03-07 DIAGNOSIS — I10 PRIMARY HYPERTENSION: Primary | ICD-10-CM

## 2024-03-07 RX ORDER — LOSARTAN POTASSIUM 100 MG/1
100 TABLET ORAL DAILY
Qty: 90 TABLET | Refills: 3 | Status: SHIPPED | OUTPATIENT
Start: 2024-03-07 | End: 2025-03-07

## 2024-03-07 NOTE — TELEPHONE ENCOUNTER
Pt states he has been taking Losartan 50mg. Informed pt that Brandi sent in Losartan 100mg to pharmacy and to continue monitoring his BP. Pt verbalized understanding.

## 2024-03-07 NOTE — TELEPHONE ENCOUNTER
Waking up in the mornings with BP in 130's. This morning it was 134/88 when he woke up, and then 148/98 after drinking his coffee. States he had a slight headache when he woke up and then after having his coffee was feeling a little light headed.   Wants to know if he should increase his BP meds?

## 2024-03-07 NOTE — TELEPHONE ENCOUNTER
----- Message from Shruthi Roach sent at 3/7/2024 12:14 PM CST -----  Regarding: Medication- bloor pressure  Type:  Needs Medical Advice    Who Called: Kt     Symptoms (please be specific):      How long has patient had these symptoms:      Pharmacy name and phone #:      Would the patient rather a call back or a response via [facility]?     Best Call Back Number: [phone]    Additional Information: patient said his bloor pressure has been a little high and wants to know if he can double up on his medication or if the doctor can call him in new prescription. Please advise

## 2024-04-03 LAB
ALBUMIN SERPL BCP-MCNC: 4.1 G/DL
ALP SERPL-CCNC: 54 U/L (ref 25–125)
ALT SERPL-CCNC: 21 U/L
AST SERPL-CCNC: 27 U/L
BILIRUB SERPL-MCNC: 0.3 MG/DL
CALCIUM SERPL-MCNC: 8.9 MG/DL
CHLORIDE: 104 MMOL/L
CHOLESTEROL, TOTAL: 196
CREATINE, SERUM: 1
EGFR: 76.41
ERYTHROCYTE [DISTWIDTH] IN BLOOD BY AUTOMATED COUNT: 12.6 %
GLUCOSE: 96 MG/DL
HCT VFR BLD AUTO: 43.8 %
HDLC SERPL-MCNC: 24 MG/DL
HGB BLD-MCNC: 14.3 G/DL
LDLC SERPL CALC-MCNC: 140 MG/DL
MCH RBC QN AUTO: 29.6 PG
MCHC RBC AUTO-ENTMCNC: 32.6 G/DL
MCV RBC AUTO: 90.7 FL
MPC BLD CALC-MCNC: 9 FL
PLATELET # BLD AUTO: 197 K/UL (ref 150–399)
POTASSIUM: 5.8 MMOL/L
PROT SERPL-MCNC: 6.9 G/DL
PSA, TOTAL (OHS): 0.82 NG/ML
RBC # BLD AUTO: 4.83 M/UL
SODIUM: 137 MMOL/L
TESTOST SERPL-MCNC: 516 NG/DL
TRIGL SERPL-MCNC: 162 MG/DL
UREA NITROGEN (BUN): 17 MG/DL
VLDLC SERPL-MCNC: 32 MG/DL
WBC # BLD AUTO: 6.2 K/UL

## 2024-05-02 ENCOUNTER — TELEPHONE (OUTPATIENT)
Dept: FAMILY MEDICINE | Facility: CLINIC | Age: 59
End: 2024-05-02
Payer: COMMERCIAL

## 2024-05-02 ENCOUNTER — PATIENT MESSAGE (OUTPATIENT)
Dept: FAMILY MEDICINE | Facility: CLINIC | Age: 59
End: 2024-05-02
Payer: COMMERCIAL

## 2024-05-02 NOTE — TELEPHONE ENCOUNTER
Attempted to contact patient for previsit on 5/2/24 at 9:18.  Message sent to patient through portal.

## 2024-05-09 ENCOUNTER — DOCUMENTATION ONLY (OUTPATIENT)
Dept: FAMILY MEDICINE | Facility: CLINIC | Age: 59
End: 2024-05-09

## 2024-05-09 ENCOUNTER — OFFICE VISIT (OUTPATIENT)
Dept: FAMILY MEDICINE | Facility: CLINIC | Age: 59
End: 2024-05-09
Payer: COMMERCIAL

## 2024-05-09 ENCOUNTER — PATIENT MESSAGE (OUTPATIENT)
Dept: FAMILY MEDICINE | Facility: CLINIC | Age: 59
End: 2024-05-09

## 2024-05-09 VITALS
DIASTOLIC BLOOD PRESSURE: 88 MMHG | RESPIRATION RATE: 16 BRPM | SYSTOLIC BLOOD PRESSURE: 158 MMHG | HEART RATE: 64 BPM | BODY MASS INDEX: 26.37 KG/M2 | TEMPERATURE: 98 F | OXYGEN SATURATION: 98 % | WEIGHT: 168 LBS | HEIGHT: 67 IN

## 2024-05-09 VITALS — DIASTOLIC BLOOD PRESSURE: 77 MMHG | SYSTOLIC BLOOD PRESSURE: 127 MMHG

## 2024-05-09 DIAGNOSIS — I10 PRIMARY HYPERTENSION: Primary | ICD-10-CM

## 2024-05-09 DIAGNOSIS — D75.1 SECONDARY POLYCYTHEMIA: ICD-10-CM

## 2024-05-09 DIAGNOSIS — Z13.1 SCREENING FOR DIABETES MELLITUS: ICD-10-CM

## 2024-05-09 DIAGNOSIS — F41.1 ANXIETY STATE: ICD-10-CM

## 2024-05-09 DIAGNOSIS — Z13.6 SCREENING FOR CARDIOVASCULAR CONDITION: ICD-10-CM

## 2024-05-09 DIAGNOSIS — I27.21 SECONDARY PULMONARY ARTERIAL HYPERTENSION: ICD-10-CM

## 2024-05-09 DIAGNOSIS — Z12.5 PROSTATE CANCER SCREENING: ICD-10-CM

## 2024-05-09 DIAGNOSIS — Z00.00 ANNUAL PHYSICAL EXAM: ICD-10-CM

## 2024-05-09 DIAGNOSIS — E78.2 MODERATE MIXED HYPERLIPIDEMIA NOT REQUIRING STATIN THERAPY: ICD-10-CM

## 2024-05-09 DIAGNOSIS — E29.1 HYPOGONADISM IN MALE: ICD-10-CM

## 2024-05-09 PROCEDURE — 3077F SYST BP >= 140 MM HG: CPT | Mod: CPTII,,, | Performed by: NURSE PRACTITIONER

## 2024-05-09 PROCEDURE — 3079F DIAST BP 80-89 MM HG: CPT | Mod: CPTII,,, | Performed by: NURSE PRACTITIONER

## 2024-05-09 PROCEDURE — 99214 OFFICE O/P EST MOD 30 MIN: CPT | Mod: ,,, | Performed by: NURSE PRACTITIONER

## 2024-05-09 PROCEDURE — 1159F MED LIST DOCD IN RCRD: CPT | Mod: CPTII,,, | Performed by: NURSE PRACTITIONER

## 2024-05-09 PROCEDURE — 3008F BODY MASS INDEX DOCD: CPT | Mod: CPTII,,, | Performed by: NURSE PRACTITIONER

## 2024-05-09 PROCEDURE — 4010F ACE/ARB THERAPY RXD/TAKEN: CPT | Mod: CPTII,,, | Performed by: NURSE PRACTITIONER

## 2024-05-09 PROCEDURE — 1160F RVW MEDS BY RX/DR IN RCRD: CPT | Mod: CPTII,,, | Performed by: NURSE PRACTITIONER

## 2024-05-09 RX ORDER — HYDROCHLOROTHIAZIDE 12.5 MG/1
12.5 TABLET ORAL DAILY
Qty: 90 TABLET | Refills: 3 | Status: SHIPPED | OUTPATIENT
Start: 2024-05-09 | End: 2025-05-09

## 2024-05-09 NOTE — PROGRESS NOTES
Subjective:       Patient ID: Kt Guzman is a 58 y.o. male.    Chief Complaint: Hypertension (6m fu), Hyperlipidemia (6m fu), and Hypogonadism (6m fu)      HPI   This is a 58-year-old white male who presents to clinic today for six-month follow-up for hypertension, hyperlipidemia, polycythemia, hypogonadism, anxiety.  Reports overall he is doing well.  No complaints today.  Would like to get a carotid ultrasound done, the last 1 he had was greater than 10 years ago.  Review of Systems  Comprehensive review of systems negative except as stated in HPI    The patient's Health Maintenance was reviewed and the following appears to be due:   There are no preventive care reminders to display for this patient.    Past Medical History:  Past Medical History:   Diagnosis Date    Anxiety disorder, unspecified     GERD (gastroesophageal reflux disease)     Hyperlipidemia     Hypertensive disorder     Male hypogonadism      Past Surgical History:   Procedure Laterality Date    COLONOSCOPY  2020    Dr. Hernandez    FACIAL RECONSTRUCTION SURGERY       Review of patient's allergies indicates:   Allergen Reactions    Phenytoin sodium extended Hives     Current Outpatient Medications on File Prior to Visit   Medication Sig Dispense Refill    EScitalopram oxalate (LEXAPRO) 5 MG Tab Take 1 tablet (5 mg total) by mouth once daily. 90 tablet 3    losartan (COZAAR) 100 MG tablet Take 1 tablet (100 mg total) by mouth once daily. 90 tablet 3    testosterone cypionate (DEPOTESTOTERONE CYPIONATE) 200 mg/mL injection SMARTSI.5 Milliliter(s) IM Once a Week      [DISCONTINUED] albuterol (PROVENTIL/VENTOLIN HFA) 90 mcg/actuation inhaler Inhale 2 puffs into the lungs every 6 (six) hours as needed.      [DISCONTINUED] ALPRAZolam (XANAX) 0.5 MG tablet Take 0.5 tablets (0.25 mg total) by mouth daily as needed for Anxiety. 30 tablet 2    [DISCONTINUED] butalbital-acetaminophen-caffeine -40 mg (FIORICET, ESGIC) -40 mg per tablet  "Take 1 tablet by mouth every 6 (six) hours as needed for Headaches. 60 tablet 1    [DISCONTINUED] hydroCHLOROthiazide (HYDRODIURIL) 12.5 MG Tab Take 1 tablet (12.5 mg total) by mouth once daily. 90 tablet 3     No current facility-administered medications on file prior to visit.     Social History     Socioeconomic History    Marital status:    Tobacco Use    Smoking status: Never    Smokeless tobacco: Never   Substance and Sexual Activity    Alcohol use: Yes     Comment: occassional    Drug use: Never    Sexual activity: Yes     Partners: Female     Birth control/protection: None     Social Determinants of Health     Financial Resource Strain: Low Risk  (5/6/2024)    Overall Financial Resource Strain (CARDIA)     Difficulty of Paying Living Expenses: Not hard at all   Food Insecurity: No Food Insecurity (5/6/2024)    Hunger Vital Sign     Worried About Running Out of Food in the Last Year: Never true     Ran Out of Food in the Last Year: Never true   Transportation Needs: No Transportation Needs (5/6/2024)    PRAPARE - Transportation     Lack of Transportation (Medical): No     Lack of Transportation (Non-Medical): No   Physical Activity: Insufficiently Active (5/6/2024)    Exercise Vital Sign     Days of Exercise per Week: 3 days     Minutes of Exercise per Session: 20 min   Stress: No Stress Concern Present (5/6/2024)    [employer] - Occupational Stress Questionnaire     Feeling of Stress : Not at all   Housing Stability: Unknown (5/6/2024)    Housing Stability Vital Sign     Unable to Pay for Housing in the Last Year: No     Family History   Problem Relation Name Age of Onset    Heart block Mother      Heart block Father      Prostate cancer Father      Valvular heart disease Father         Objective:       BP (!) 158/88   Pulse 64   Temp 97.6 °F (36.4 °C) (Oral)   Resp 16   Ht 5' 7" (1.702 m)   Wt 76.2 kg (168 lb)   SpO2 98%   BMI 26.31 kg/m²      Physical " Exam  Vitals and nursing note reviewed.   Constitutional:       Appearance: Normal appearance. He is normal weight.   HENT:      Head: Normocephalic and atraumatic.      Right Ear: Tympanic membrane, ear canal and external ear normal.      Left Ear: Tympanic membrane, ear canal and external ear normal.      Nose: Nose normal.      Mouth/Throat:      Mouth: Mucous membranes are moist.      Pharynx: Oropharynx is clear.   Eyes:      Extraocular Movements: Extraocular movements intact.      Conjunctiva/sclera: Conjunctivae normal.      Pupils: Pupils are equal, round, and reactive to light.   Cardiovascular:      Rate and Rhythm: Normal rate and regular rhythm.      Pulses: Normal pulses.      Heart sounds: Normal heart sounds.   Pulmonary:      Effort: Pulmonary effort is normal.      Breath sounds: Normal breath sounds.   Musculoskeletal:         General: Normal range of motion.      Cervical back: Normal range of motion and neck supple.   Skin:     General: Skin is warm and dry.   Neurological:      General: No focal deficit present.      Mental Status: He is alert and oriented to person, place, and time.   Psychiatric:         Mood and Affect: Mood normal.         Behavior: Behavior normal.         Thought Content: Thought content normal.         Judgment: Judgment normal.         Labs  Documentation Only on 05/09/2024   Component Date Value Ref Range Status    PSA Total 04/03/2024 0.82  ng/mL Final    Cholesterol, Total 04/03/2024 196   Final    HDL 04/03/2024 24  mg/dL Final    Triglycerides 04/03/2024 162  mg/dL Final    LDL Cholesterol 04/03/2024 140.00  mg/dL Final    VLDL 04/03/2024 32  mg/dL Final    Testosterone, Total 04/03/2024 516  ng/dL Final    WBC 04/03/2024 6.2  K/uL Final    RBC 04/03/2024 4.83  M/uL Final    Hemoglobin 04/03/2024 14.3  g/dL Final    Hematocrit 04/03/2024 43.8  % Final    MCV 04/03/2024 90.7  fL Final    MCH 04/03/2024 29.6  pg Final    MCHC 04/03/2024 32.6  g/dL Final    RDW  04/03/2024 12.6  % Final    Platelets 04/03/2024 197  150 - 399 K/uL Final    MPV 04/03/2024 9.0  fL Final    Sodium 04/03/2024 137  mmol/L Final    Potassium 04/03/2024 5.8  mmol/L Final    Chloride 04/03/2024 104  mmol/L Final    Alkaline Phosphatase 04/03/2024 54  25 - 125 U/L Final    Albumin 04/03/2024 4.1  g/dL Final    ALT 04/03/2024 21  U/L Final    AST 04/03/2024 27  U/L Final    BUN 04/03/2024 17  mg/dL Final    Creatine, Serum 04/03/2024 1.0   Final    Calcium 04/03/2024 8.9  mg/dL Final    Glucose 04/03/2024 96  mg/dL Final    Bilirubin Total 04/03/2024 0.3  mg/dL Final    Total Protein 04/03/2024 6.9  g/dL Final    eGFR 04/03/2024 76.41   Final       Assessment and Plan       ICD-10-CM ICD-9-CM   1. Primary hypertension  I10 401.9   2. Moderate mixed hyperlipidemia not requiring statin therapy  E78.2 272.2   3. Secondary polycythemia  D75.1 289.0   4. Hypogonadism in male  E29.1 257.2   5. Secondary pulmonary arterial hypertension  I27.21 416.8   6. Screening for cardiovascular condition  Z13.6 V81.2   7. Anxiety state  F41.1 300.00   8. Prostate cancer screening  Z12.5 V76.44   9. Annual physical exam  Z00.00 V70.0   10. Screening for diabetes mellitus  Z13.1 V77.1        1. Primary hypertension  Overview:  10/28/2021 - amlodipine 5 mg daily  10/29/2021 - DC amlodipine due to dizziness, start ramipril 5 mg daily  01/31/2022 - DC ramipril due to cough, start losartan 25 mg daily  11/03/2022 - losartan 50 mg daily  05/02/2023 - decrease losartan back to 25 mg due to headache, add HCTZ 12.5 mg daily  03/07/2024 - losartan 100 mg     Assessment & Plan:  Blood pressure elevated x2 in clinic today, patient did take NyQuil last night because he could not sleep.  Instructed to avoid decongestants.  Take ZzzQuil for sleep if needed.  Never started the HCTZ.  Sent in a new prescription for HCTZ but instructed to hold and monitor blood pressure for the next few weeks.  Will send [employer] message in 2 weeks for  home blood pressure readings.    Orders:  -     hydroCHLOROthiazide (HYDRODIURIL) 12.5 MG Tab; Take 1 tablet (12.5 mg total) by mouth once daily.  Dispense: 90 tablet; Refill: 3    2. Moderate mixed hyperlipidemia not requiring statin therapy  Overview:  Diet controlled     Assessment & Plan:  Improved, recent labs with Urology with total cholesterol 196, HDL 24, triglycerides 162, .  Carotid ultrasound ordered per patient request.      3. Secondary polycythemia  Overview:  Due to TRT  The[facility]lebotomy at [facility]    Assessment & Plan:  Improved with therapeutic phlebotomy, recent labs with H&H of 14.3 and 43.8.      4. Hypogonadism in male  Overview:  Managed by Dr. Crook, on Depo testosterone 200 mg/ml 0.5 mL IM weekly    Assessment & Plan:  Stable, continue management per Urology.      5. Secondary pulmonary arterial hypertension  Overview:  Dr May    Assessment & Plan:  Stable, surveillance per Dr. May.      6. Screening for cardiovascular condition  -     US Carotid Bilateral; Future; Expected date: 05/09/2024    7. Anxiety state  Overview:  Lexapro 5 mg daily  Xanax 0.5 mg 1/2-1 tablet daily as needed    Assessment & Plan:  Stable, continue Lexapro 5 mg daily, follow-up 6 months with wellness.      8. Prostate cancer screening  Overview:  Followed by Dr. Crook    Assessment & Plan:  Recent PSA normal at 0.82.  Continue management per Urology.      9. Annual physical exam  -     CBC Auto Differential; Future; Expected date: 11/09/2024  -     Comprehensive Metabolic Panel; Future; Expected date: 11/09/2024  -     Lipid Panel; Future; Expected date: 11/09/2024  -     TSH; Future; Expected date: 11/09/2024  -     Hemoglobin A1C; Future; Expected date: 11/09/2024    10. Screening for diabetes mellitus  -     Hemoglobin A1C; Future; Expected date: 11/09/2024           Follow up in 6 months (on 11/12/2024) for Annual.

## 2024-05-09 NOTE — ASSESSMENT & PLAN NOTE
Improved, recent labs with Urology with total cholesterol 196, HDL 24, triglycerides 162, .  Carotid ultrasound ordered per patient request.

## 2024-05-09 NOTE — ASSESSMENT & PLAN NOTE
Blood pressure elevated x2 in clinic today, patient did take NyQuil last night because he could not sleep.  Instructed to avoid decongestants.  Take ZzzQuil for sleep if needed.  Never started the HCTZ.  Sent in a new prescription for HCTZ but instructed to hold and monitor blood pressure for the next few weeks.  Will send [employer] message in 2 weeks for home blood pressure readings.

## 2024-05-22 DIAGNOSIS — F41.1 ANXIETY STATE: ICD-10-CM

## 2024-05-22 RX ORDER — ESCITALOPRAM OXALATE 5 MG/1
5 TABLET ORAL
Qty: 90 TABLET | Refills: 3 | Status: SHIPPED | OUTPATIENT
Start: 2024-05-22

## 2024-05-28 ENCOUNTER — TELEPHONE (OUTPATIENT)
Dept: FAMILY MEDICINE | Facility: CLINIC | Age: 59
End: 2024-05-28
Payer: COMMERCIAL

## 2024-07-18 ENCOUNTER — TELEPHONE (OUTPATIENT)
Dept: FAMILY MEDICINE | Facility: CLINIC | Age: 59
End: 2024-07-18
Payer: COMMERCIAL

## 2024-07-18 DIAGNOSIS — F41.1 ANXIETY STATE: Primary | ICD-10-CM

## 2024-07-18 NOTE — TELEPHONE ENCOUNTER
----- Message from Cari Rinaldi sent at 7/18/2024  3:57 PM CDT -----  .Who Called: Kt Guzman    Caller is requesting assistance/information from provider's office.          Preferred Method of Contact: Phone Call    Patient's Preferred Phone Number on File: [phone]     Best Call Back Number, if different:    Additional Information: Pt called wanting to know if it's safe for him to get off   EScitalopram oxalate (LEXAPRO) 5 MG Tab

## 2024-07-18 NOTE — TELEPHONE ENCOUNTER
Yes, it is okay for him to stop it if he would like.  He needs to take it every other day for about a week and then stop it.  If he notices his anxiety gets worse and he wants to get back on it, let him know to come in and discuss.

## 2024-10-23 LAB — PSA, TOTAL (OHS): 0.89 NG/ML (ref 0–4)

## 2024-11-05 ENCOUNTER — PATIENT MESSAGE (OUTPATIENT)
Dept: FAMILY MEDICINE | Facility: CLINIC | Age: 59
End: 2024-11-05
Payer: COMMERCIAL

## 2024-11-12 PROBLEM — Z12.11 COLON CANCER SCREENING: Status: ACTIVE | Noted: 2024-11-12

## 2025-03-14 DIAGNOSIS — I10 PRIMARY HYPERTENSION: ICD-10-CM

## 2025-03-14 RX ORDER — LOSARTAN POTASSIUM 100 MG/1
100 TABLET ORAL
Qty: 90 TABLET | Refills: 3 | Status: SHIPPED | OUTPATIENT
Start: 2025-03-14

## 2025-04-10 ENCOUNTER — TELEPHONE (OUTPATIENT)
Dept: FAMILY MEDICINE | Facility: CLINIC | Age: 60
End: 2025-04-10
Payer: COMMERCIAL

## 2025-04-10 ENCOUNTER — DOCUMENTATION ONLY (OUTPATIENT)
Dept: FAMILY MEDICINE | Facility: CLINIC | Age: 60
End: 2025-04-10
Payer: COMMERCIAL

## 2025-04-10 NOTE — LETTER
AUTHORIZATION FOR RELEASE OF   CONFIDENTIAL INFORMATION    Dear Dr. Wolf Crook,    We are seeing Kt Guzman, date of birth 1965, in the clinic at [facility]. Elyse Jesus FNP is the patient's PCP. Kt Guzman has an outstanding lab/procedure at the time we reviewed his chart. In order to help keep his health information updated, he has authorized us to request the following medical record(s):        (  )  MAMMOGRAM                                      (  )  COLONOSCOPY      (  )  PAP SMEAR                                          ( x )  LAB RESULTS     (  )  DEXA SCAN                                          (  )  EYE EXAM            (  )  FOOT EXAM                                          (  )  ENTIRE RECORD     (  )  OUTSIDE IMMUNIZATIONS                 ( x )  MOST RECENT NOTE         Please fax records to [facility], [place], Kathryn F., FNP, [phone]     If you have any questions, please contact [phone]           Patient Name: Kt Guzman  : 1965  Patient Phone #: [phone]

## 2025-04-10 NOTE — TELEPHONE ENCOUNTER
Are there any outstanding tasks in patient's chart?  labs    2. Do we have outstanding/pending referrals?  n    3. Has the patient been seen in an ER, Urgent Care, or admitted since last visit?  n    4. Has patient seen any other health care providers since last visit?  Dr. Crook    5.  Has patient had any blood work or x-rays done since last visit?   Will complete labs before appointment

## 2025-04-14 ENCOUNTER — TELEPHONE (OUTPATIENT)
Dept: FAMILY MEDICINE | Facility: CLINIC | Age: 60
End: 2025-04-14
Payer: COMMERCIAL

## 2025-04-14 DIAGNOSIS — Z00.00 ANNUAL PHYSICAL EXAM: ICD-10-CM

## 2025-04-14 DIAGNOSIS — Z13.1 SCREENING FOR DIABETES MELLITUS: Primary | ICD-10-CM

## 2025-04-14 LAB
ALBUMIN SERPL-MCNC: 4.6 G/DL (ref 3.8–4.9)
ALP SERPL-CCNC: 73 IU/L (ref 44–121)
ALT SERPL-CCNC: 16 IU/L (ref 0–44)
AST SERPL-CCNC: 18 IU/L (ref 0–40)
BASOPHILS # BLD AUTO: 0 X10E3/UL (ref 0–0.2)
BASOPHILS NFR BLD AUTO: 0 %
BILIRUB SERPL-MCNC: 0.3 MG/DL (ref 0–1.2)
BUN SERPL-MCNC: 22 MG/DL (ref 8–27)
BUN/CREAT SERPL: 21 (ref 10–24)
CALCIUM SERPL-MCNC: 9.7 MG/DL (ref 8.6–10.2)
CHLORIDE SERPL-SCNC: 97 MMOL/L (ref 96–106)
CHOLEST SERPL-MCNC: 204 MG/DL (ref 100–199)
CO2 SERPL-SCNC: 28 MMOL/L (ref 20–29)
CREAT SERPL-MCNC: 1.03 MG/DL (ref 0.76–1.27)
EOSINOPHIL # BLD AUTO: 0.1 X10E3/UL (ref 0–0.4)
EOSINOPHIL NFR BLD AUTO: 1 %
ERYTHROCYTE [DISTWIDTH] IN BLOOD BY AUTOMATED COUNT: 13.4 % (ref 11.6–15.4)
EST. GFR  (NO RACE VARIABLE): 83 ML/MIN/1.73
GLOBULIN SER CALC-MCNC: 2.9 G/DL (ref 1.5–4.5)
GLUCOSE SERPL-MCNC: 104 MG/DL (ref 70–99)
HBA1C MFR BLD: 5.4 % (ref 4.8–5.6)
HCT VFR BLD AUTO: 46.6 % (ref 37.5–51)
HDLC SERPL-MCNC: 30 MG/DL
HGB BLD-MCNC: 14.9 G/DL (ref 13–17.7)
IMM GRANULOCYTES NFR BLD AUTO: 1 %
LDLC SERPL CALC-MCNC: 135 MG/DL (ref 0–99)
LYMPHOCYTES # BLD AUTO: 2 X10E3/UL (ref 0.7–3.1)
LYMPHOCYTES NFR BLD AUTO: 33 %
MCH RBC QN AUTO: 28.5 PG (ref 26.6–33)
MCHC RBC AUTO-ENTMCNC: 32 G/DL (ref 31.5–35.7)
MCV RBC AUTO: 89 FL (ref 79–97)
MONOCYTES # BLD AUTO: 0.9 X10E3/UL (ref 0.1–0.9)
MONOCYTES NFR BLD AUTO: 14 %
NEUTROPHILS # BLD AUTO: 3.2 X10E3/UL (ref 1.4–7)
NEUTROPHILS NFR BLD AUTO: 51 %
PLATELET # BLD AUTO: 280 X10E3/UL (ref 150–450)
POTASSIUM SERPL-SCNC: 4.7 MMOL/L (ref 3.5–5.2)
PROT SERPL-MCNC: 7.5 G/DL (ref 6–8.5)
RBC # BLD AUTO: 5.22 X10E6/UL (ref 4.14–5.8)
SODIUM SERPL-SCNC: 140 MMOL/L (ref 134–144)
TRIGL SERPL-MCNC: 217 MG/DL (ref 0–149)
TSH SERPL DL<=0.005 MIU/L-ACNC: 0.83 UIU/ML (ref 0.45–4.5)
VLDLC SERPL CALC-MCNC: 39 MG/DL (ref 5–40)
WBC # BLD AUTO: 6.3 X10E3/UL (ref 3.4–10.8)

## 2025-04-15 ENCOUNTER — RESULTS FOLLOW-UP (OUTPATIENT)
Dept: FAMILY MEDICINE | Facility: CLINIC | Age: 60
End: 2025-04-15

## 2025-04-17 ENCOUNTER — OFFICE VISIT (OUTPATIENT)
Dept: FAMILY MEDICINE | Facility: CLINIC | Age: 60
End: 2025-04-17
Payer: COMMERCIAL

## 2025-04-17 VITALS
BODY MASS INDEX: 27.15 KG/M2 | SYSTOLIC BLOOD PRESSURE: 133 MMHG | RESPIRATION RATE: 16 BRPM | DIASTOLIC BLOOD PRESSURE: 89 MMHG | WEIGHT: 173 LBS | OXYGEN SATURATION: 98 % | HEART RATE: 66 BPM | HEIGHT: 67 IN

## 2025-04-17 DIAGNOSIS — Z12.5 PROSTATE CANCER SCREENING: ICD-10-CM

## 2025-04-17 DIAGNOSIS — F41.1 ANXIETY STATE: ICD-10-CM

## 2025-04-17 DIAGNOSIS — E78.2 MODERATE MIXED HYPERLIPIDEMIA NOT REQUIRING STATIN THERAPY: ICD-10-CM

## 2025-04-17 DIAGNOSIS — Z12.11 COLON CANCER SCREENING: ICD-10-CM

## 2025-04-17 DIAGNOSIS — Z00.00 ANNUAL PHYSICAL EXAM: Primary | ICD-10-CM

## 2025-04-17 DIAGNOSIS — D75.1 SECONDARY POLYCYTHEMIA: ICD-10-CM

## 2025-04-17 DIAGNOSIS — I10 PRIMARY HYPERTENSION: ICD-10-CM

## 2025-04-17 DIAGNOSIS — I27.21 SECONDARY PULMONARY ARTERIAL HYPERTENSION: ICD-10-CM

## 2025-04-17 PROCEDURE — 3079F DIAST BP 80-89 MM HG: CPT | Mod: CPTII,,, | Performed by: NURSE PRACTITIONER

## 2025-04-17 PROCEDURE — 1160F RVW MEDS BY RX/DR IN RCRD: CPT | Mod: CPTII,,, | Performed by: NURSE PRACTITIONER

## 2025-04-17 PROCEDURE — 1159F MED LIST DOCD IN RCRD: CPT | Mod: CPTII,,, | Performed by: NURSE PRACTITIONER

## 2025-04-17 PROCEDURE — 3044F HG A1C LEVEL LT 7.0%: CPT | Mod: CPTII,,, | Performed by: NURSE PRACTITIONER

## 2025-04-17 PROCEDURE — 99396 PREV VISIT EST AGE 40-64: CPT | Mod: ,,, | Performed by: NURSE PRACTITIONER

## 2025-04-17 PROCEDURE — 4010F ACE/ARB THERAPY RXD/TAKEN: CPT | Mod: CPTII,,, | Performed by: NURSE PRACTITIONER

## 2025-04-17 PROCEDURE — 3075F SYST BP GE 130 - 139MM HG: CPT | Mod: CPTII,,, | Performed by: NURSE PRACTITIONER

## 2025-04-17 PROCEDURE — 3008F BODY MASS INDEX DOCD: CPT | Mod: CPTII,,, | Performed by: NURSE PRACTITIONER

## 2025-04-17 RX ORDER — ESCITALOPRAM OXALATE 10 MG/1
10 TABLET ORAL DAILY
Qty: 90 TABLET | Refills: 3 | Status: SHIPPED | OUTPATIENT
Start: 2025-04-17 | End: 2026-04-17

## 2025-04-17 NOTE — PROGRESS NOTES
"Subjective:       Patient ID: Kt Guzman is a 59 y.o. male.    Chief Complaint: Annual Exam      History of Present Illness    CHIEF COMPLAINT:  Patient presents today for wellness visit    HYPERTENSION:  He reports home blood pressure readings in the 120-130s/80s, with variations depending on diet. He continues losartan and has discontinued hydrochlorothiazide.    POLYCYTHEMIA:  He undergoes therapeutic phlebotomy every two months. He experiences "thick headaches in the morning" when his levels exceed 16, which he uses as an indicator to seek phlebotomy treatment.    MEDICATIONS:  He continues Lexapro. He is on testosterone replacement therapy under Dr. Crook's care and reports significant fatigue when not taking testosterone.    DIET AND LIFESTYLE:  He practices intermittent fasting daily for 16-18 hours, not eating between 6 PM and 12 PM the following day. He reports significant improvement in headaches and fatigue with this regimen.    MEDICAL HISTORY:  Colonoscopy revealed polyp on initial screening. Follow-up colonoscopy in January 2020 showed no polyps.  Patient reports that he was told repeat in 10 years after this last colonoscopy.       Review of Systems  Comprehensive review of systems negative except as stated in HPI    The patient's Health Maintenance was reviewed and the following appears to be due:   Health Maintenance Due   Topic Date Due    Colorectal Cancer Screening  01/17/2025       Past Medical History:  Past Medical History:   Diagnosis Date    Anxiety disorder, unspecified     GERD (gastroesophageal reflux disease)     Hyperlipidemia     Hypertensive disorder     Male hypogonadism      Past Surgical History:   Procedure Laterality Date    COLONOSCOPY  01/17/2020    Dr. Hernandez    FACIAL RECONSTRUCTION SURGERY       Review of patient's allergies indicates:   Allergen Reactions    Phenytoin sodium extended Hives     Medications Ordered Prior to Encounter[1]  Social History[2]  Family " "History   Problem Relation Name Age of Onset    Heart block Mother      Heart block Father      Prostate cancer Father      Valvular heart disease Father         Objective:       /89 (BP Location: Left arm)   Pulse 66   Resp 16   Ht 5' 7" (1.702 m)   Wt 78.5 kg (173 lb)   SpO2 98%   BMI 27.10 kg/m²      Physical Exam  Vitals and nursing note reviewed.   Constitutional:       Appearance: Normal appearance. He is normal weight.   HENT:      Head: Normocephalic and atraumatic.      Right Ear: Tympanic membrane, ear canal and external ear normal.      Left Ear: Tympanic membrane, ear canal and external ear normal.      Nose: Nose normal.      Mouth/Throat:      Mouth: Mucous membranes are moist.      Pharynx: Oropharynx is clear.   Eyes:      Extraocular Movements: Extraocular movements intact.      Conjunctiva/sclera: Conjunctivae normal.      Pupils: Pupils are equal, round, and reactive to light.   Cardiovascular:      Rate and Rhythm: Normal rate and regular rhythm.      Pulses: Normal pulses.      Heart sounds: Normal heart sounds.   Pulmonary:      Effort: Pulmonary effort is normal.      Breath sounds: Normal breath sounds.   Abdominal:      General: Abdomen is flat. Bowel sounds are normal.      Palpations: Abdomen is soft.   Musculoskeletal:         General: Normal range of motion.      Cervical back: Normal range of motion and neck supple.   Skin:     General: Skin is warm and dry.   Neurological:      General: No focal deficit present.      Mental Status: He is alert and oriented to person, place, and time.   Psychiatric:         Mood and Affect: Mood normal.         Behavior: Behavior normal.         Thought Content: Thought content normal.         Judgment: Judgment normal.         Labs  Telephone on 04/14/2025   Component Date Value Ref Range Status    WBC 04/14/2025 6.3  3.4 - 10.8 x10E3/uL Final    RBC 04/14/2025 5.22  4.14 - 5.80 x10E6/uL Final    Hemoglobin 04/14/2025 14.9  13.0 - 17.7 g/dL " Final    Hematocrit 04/14/2025 46.6  37.5 - 51.0 % Final    MCV 04/14/2025 89  79 - 97 fL Final    MCH 04/14/2025 28.5  26.6 - 33.0 pg Final    MCHC 04/14/2025 32.0  31.5 - 35.7 g/dL Final    RDW 04/14/2025 13.4  11.6 - 15.4 % Final    Platelets 04/14/2025 280  150 - 450 x10E3/uL Final    Neutrophils 04/14/2025 51  Not Estab. % Final    Lymphs 04/14/2025 33  Not Estab. % Final    Monocytes 04/14/2025 14  Not Estab. % Final    Eos 04/14/2025 1  Not Estab. % Final    Basos 04/14/2025 0  Not Estab. % Final    Neutrophils (Absolute) 04/14/2025 3.2  1.4 - 7.0 x10E3/uL Final    Lymphs (Absolute) 04/14/2025 2.0  0.7 - 3.1 x10E3/uL Final    Monocytes(Absolute) 04/14/2025 0.9  0.1 - 0.9 x10E3/uL Final    Eos (Absolute) 04/14/2025 0.1  0.0 - 0.4 x10E3/uL Final    Baso (Absolute) 04/14/2025 0.0  0.0 - 0.2 x10E3/uL Final    Immature Granulocytes 04/14/2025 1  Not Estab. % Final    Glucose 04/14/2025 104 (H)  70 - 99 mg/dL Final    BUN 04/14/2025 22  8 - 27 mg/dL Final    Creatinine 04/14/2025 1.03  0.76 - 1.27 mg/dL Final    eGFR 04/14/2025 83  >59 mL/min/1.73 Final    BUN/Creatinine Ratio 04/14/2025 21  10 - 24 Final    Sodium 04/14/2025 140  134 - 144 mmol/L Final    Potassium 04/14/2025 4.7  3.5 - 5.2 mmol/L Final    Chloride 04/14/2025 97  96 - 106 mmol/L Final    CO2 04/14/2025 28  20 - 29 mmol/L Final    Calcium 04/14/2025 9.7  8.6 - 10.2 mg/dL Final    Protein, Total 04/14/2025 7.5  6.0 - 8.5 g/dL Final    Albumin 04/14/2025 4.6  3.8 - 4.9 g/dL Final    Globulin, Total 04/14/2025 2.9  1.5 - 4.5 g/dL Final    Total Bilirubin 04/14/2025 0.3  0.0 - 1.2 mg/dL Final    Alkaline Phosphatase 04/14/2025 73  44 - 121 IU/L Final    AST 04/14/2025 18  0 - 40 IU/L Final    ALT 04/14/2025 16  0 - 44 IU/L Final    Cholesterol 04/14/2025 204 (H)  100 - 199 mg/dL Final    Triglycerides 04/14/2025 217 (H)  0 - 149 mg/dL Final    HDL 04/14/2025 30 (L)  >39 mg/dL Final    VLDL Cholesterol Ralph 04/14/2025 39  5 - 40 mg/dL Final    LDL  Calculated 04/14/2025 135 (H)  0 - 99 mg/dL Final    TSH 04/14/2025 0.831  0.450 - 4.500 uIU/mL Final    Hemoglobin A1c 04/14/2025 5.4  4.8 - 5.6 % Final    Comment:          Prediabetes: 5.7 - 6.4           Diabetes: >6.4           Glycemic control for adults with diabetes: <7.0     Documentation Only on 04/10/2025   Component Date Value Ref Range Status    PSA Total 10/23/2024 0.89  0.00 - 4.00 ng/mL Final       Assessment and Plan     Assessment & Plan    I27.21 Secondary pulmonary arterial hypertension  Z00.00 Annual physical exam  F41.1 Anxiety state  E78.2 Moderate mixed hyperlipidemia not requiring statin therapy  Z12.11 Colon cancer screening  I10 Primary hypertension  Z12.5 Prostate cancer screening    IMPRESSION:  - Increased Lexapro dose from 5 mg to 10 mg daily to address mood concerns raised by his family members  - Reviewed BP readings: 133/89 in office likely due to white coat effect; home readings in 120s/80s deemed acceptable.  - Assessed lipid panel results: elevated triglycerides (217) and total cholesterol (204) compared to previous year; attributed partly to non-fasting state during blood draw.  - Evaluated effectiveness of therapeutic phlebotomy for polycythemia: improved blood counts (Hgb 14.9, Hct 46%).  - Noted improved well-being with daily 16-18 hour fasting regimen.    SECONDARY PULMONARY ARTERIAL HYPERTENSION:  - Continue therapeutic phlebotomy every 2 months or when patient experiences thick morning headaches.    ANXIETY STATE:  - Increase Lexapro dose from 5 mg to 10 mg daily to address mood concerns raised by other providers.    PRIMARY HYPERTENSION:  - Continue losartan at current dose.    LIFESTYLE CHANGES:  - Patient to maintain current intermittent fasting regimen (16-18 hour daily fast).           1. Annual physical exam  Overview:  Annual exam yearly in November      2. Anxiety state  Overview:  Lexapro 5 mg daily (discontinue 07/18/2024 per patient, felt like no longer needed  it)  Xanax 0.5 mg 1/2-1 tablet daily as needed    04/18/2025 - increase Lexapro to 10 mg daily    Orders:  -     EScitalopram oxalate (LEXAPRO) 10 MG tablet; Take 1 tablet (10 mg total) by mouth once daily.  Dispense: 90 tablet; Refill: 3    3. Moderate mixed hyperlipidemia not requiring statin therapy  Overview:  Diet controlled       4. Colon cancer screening  Overview:  Dr Hernandez  Colonoscopy 01/17/2020 - normal, recommend repeat 5 years (01/17/2025)    Assessment & Plan:  Encouraged to call Dr. Hernandez's office and inquire about repeat screening timeline.      5. Primary hypertension  Overview:  10/28/2021 - amlodipine 5 mg daily  10/29/2021 - DC amlodipine due to dizziness, start ramipril 5 mg daily  01/31/2022 - DC ramipril due to cough, start losartan 25 mg daily  11/03/2022 - losartan 50 mg daily  05/02/2023 - decrease losartan back to 25 mg due to headache, add HCTZ 12.5 mg daily  03/07/2024 - losartan 100 mg       6. Prostate cancer screening  Overview:  Followed by Dr. Crook    Assessment & Plan:  PSA 10/23/2024 0.89      7. Secondary pulmonary arterial hypertension  Overview:  Dr May    Assessment & Plan:  Reviewed last cardiology visit with Dr. May from 2023, recommend yearly follow-up.  Encouraged patient to call Dr. May for follow-up.      8. Secondary polycythemia  Overview:  Due to TRT  The[facility] phlebotomy at [facility]             Follow up in about 1 year (around 4/17/2026) for Annual.     This note was generated with the assistance of ambient listening technology. Verbal consent was obtained by the patient and accompanying visitor(s) for the recording of patient appointment to facilitate this note. I attest to having reviewed and edited the generated note for accuracy, though some syntax or spelling errors may persist. Please contact the author of this note for any clarification.            [1]   Current Outpatient Medications on File Prior to Visit   Medication Sig Dispense Refill    losartan  (COZAAR) 100 MG tablet TAKE 1 TABLET BY MOUTH EVERY DAY 90 tablet 3    testosterone cypionate (DEPOTESTOTERONE CYPIONATE) 200 mg/mL injection SMARTSI.5 Milliliter(s) IM Once a Week      [DISCONTINUED] EScitalopram oxalate (LEXAPRO) 5 MG Tab TAKE 1 TABLET BY MOUTH EVERY DAY 90 tablet 3    [DISCONTINUED] hydroCHLOROthiazide (HYDRODIURIL) 12.5 MG Tab Take 1 tablet (12.5 mg total) by mouth once daily. 90 tablet 3     No current facility-administered medications on file prior to visit.   [2]   Social History  Socioeconomic History    Marital status:    Tobacco Use    Smoking status: Never    Smokeless tobacco: Never   Substance and Sexual Activity    Alcohol use: Yes     Comment: occassional    Drug use: Never    Sexual activity: Yes     Partners: Female     Birth control/protection: None     Social Drivers of Health     Financial Resource Strain: Low Risk  (2025)    Overall Financial Resource Strain (CARDIA)     Difficulty of Paying Living Expenses: Not hard at all   Food Insecurity: No Food Insecurity (2025)    Hunger Vital Sign     Worried About Running Out of Food in the Last Year: Never true     Ran Out of Food in the Last Year: Never true   Transportation Needs: No Transportation Needs (2025)    PRAPARE - Transportation     Lack of Transportation (Medical): No     Lack of Transportation (Non-Medical): No   Physical Activity: Insufficiently Active (2025)    Exercise Vital Sign     Days of Exercise per Week: 2 days     Minutes of Exercise per Session: 20 min   Stress: Stress Concern Present (2025)    [employer] - Occupational Stress Questionnaire     Feeling of Stress : To some extent   Housing Stability: Low Risk  (2025)    Housing Stability Vital Sign     Unable to Pay for Housing in the Last Year: No     Number of Times Moved in the Last Year: 0     Homeless in the Last Year: No

## 2025-04-17 NOTE — ASSESSMENT & PLAN NOTE
Reviewed last cardiology visit with Dr. May from 2023, recommend yearly follow-up.  Encouraged patient to call Dr. May for follow-up.

## 2025-04-17 NOTE — PATIENT INSTRUCTIONS
Henrique Hernandez MD    Consulting Physician, Gastroenterology    Since 4/17/2025    [phone]

## 2025-06-17 ENCOUNTER — OFFICE VISIT (OUTPATIENT)
Dept: FAMILY MEDICINE | Facility: CLINIC | Age: 60
End: 2025-06-17
Payer: COMMERCIAL

## 2025-06-17 ENCOUNTER — TELEPHONE (OUTPATIENT)
Dept: FAMILY MEDICINE | Facility: CLINIC | Age: 60
End: 2025-06-17
Payer: COMMERCIAL

## 2025-06-17 VITALS
BODY MASS INDEX: 27.94 KG/M2 | HEIGHT: 67 IN | RESPIRATION RATE: 16 BRPM | HEART RATE: 64 BPM | WEIGHT: 178 LBS | OXYGEN SATURATION: 99 % | SYSTOLIC BLOOD PRESSURE: 181 MMHG | DIASTOLIC BLOOD PRESSURE: 90 MMHG

## 2025-06-17 DIAGNOSIS — I10 PRIMARY HYPERTENSION: Primary | ICD-10-CM

## 2025-06-17 PROCEDURE — 3044F HG A1C LEVEL LT 7.0%: CPT | Mod: CPTII,,, | Performed by: NURSE PRACTITIONER

## 2025-06-17 PROCEDURE — G2211 COMPLEX E/M VISIT ADD ON: HCPCS | Mod: ,,, | Performed by: NURSE PRACTITIONER

## 2025-06-17 PROCEDURE — 3077F SYST BP >= 140 MM HG: CPT | Mod: CPTII,,, | Performed by: NURSE PRACTITIONER

## 2025-06-17 PROCEDURE — 4010F ACE/ARB THERAPY RXD/TAKEN: CPT | Mod: CPTII,,, | Performed by: NURSE PRACTITIONER

## 2025-06-17 PROCEDURE — 1160F RVW MEDS BY RX/DR IN RCRD: CPT | Mod: CPTII,,, | Performed by: NURSE PRACTITIONER

## 2025-06-17 PROCEDURE — 1159F MED LIST DOCD IN RCRD: CPT | Mod: CPTII,,, | Performed by: NURSE PRACTITIONER

## 2025-06-17 PROCEDURE — 3080F DIAST BP >= 90 MM HG: CPT | Mod: CPTII,,, | Performed by: NURSE PRACTITIONER

## 2025-06-17 PROCEDURE — 99214 OFFICE O/P EST MOD 30 MIN: CPT | Mod: ,,, | Performed by: NURSE PRACTITIONER

## 2025-06-17 PROCEDURE — 3008F BODY MASS INDEX DOCD: CPT | Mod: CPTII,,, | Performed by: NURSE PRACTITIONER

## 2025-06-17 RX ORDER — CLONIDINE HYDROCHLORIDE 0.1 MG/1
0.1 TABLET ORAL
Status: COMPLETED | OUTPATIENT
Start: 2025-06-17 | End: 2025-06-17

## 2025-06-17 RX ORDER — CLONIDINE HYDROCHLORIDE 0.1 MG/1
0.1 TABLET ORAL 2 TIMES DAILY PRN
Qty: 30 TABLET | Refills: 1 | Status: SHIPPED | OUTPATIENT
Start: 2025-06-17 | End: 2026-06-17

## 2025-06-17 RX ORDER — HYDROCHLOROTHIAZIDE 25 MG/1
25 TABLET ORAL DAILY
Qty: 90 TABLET | Refills: 3 | Status: SHIPPED | OUTPATIENT
Start: 2025-06-17 | End: 2026-06-17

## 2025-06-17 RX ADMIN — CLONIDINE HYDROCHLORIDE 0.1 MG: 0.1 TABLET ORAL at 10:06

## 2025-06-17 NOTE — ASSESSMENT & PLAN NOTE
- Explained potential link between caffeine and BP elevation.  - Patient to check BP every morning.  - Added HCTZ to existing losartan therapy.  - Started clonidine as needed for acute management of hypertension episodes and associated symptoms (e.g., headache).  - Discussed possible side effects of clonidine, including drowsiness.  - Follow up in about a month.  - Contact the office if BP remains consistently elevated or if experiencing concerning symptoms.

## 2025-06-17 NOTE — TELEPHONE ENCOUNTER
Copied from CRM #[id number]. Topic: General Inquiry - Patient Advice  >> Jun 17, 2025  9:32 AM Eliana Bedoya wrote:  Who Called: Kt Guzman    Caller is requesting assistance/information from provider's office.    Symptoms (please be specific): blood pressure elevated  How long has patient had these symptoms:    List of preferred pharmacies on file (remove unneeded): [unfilled]  If different, enter pharmacy into here including location and phone number:       Preferred Method of Contact: Phone Call  Patient's Preferred Phone Number on File: [phone]   Best Call Back Number, if different:  Additional Information: pt stated that he has been having some elevated blood pressure and need to know if he needs to book an appt or if the dosage on medication needs to increase. Today his reading was 160/90. Please advise

## 2025-06-17 NOTE — PROGRESS NOTES
Subjective:       Patient ID: Kt Guzman is a 59 y.o. male.    Chief Complaint: Hypertension (Elevated Bp x4/5 days. Reports normal readings last week and started with Headaches with elevated BP )      History of Present Illness    CHIEF COMPLAINT:  Patient presents today for elevated blood pressure with headache    BLOOD PRESSURE:  His blood pressure was 128/77 two weeks ago when giving blood, but has elevated to 181/90 currently. He expresses concern about this significant increase over a short period.    CURRENT SYMPTOMS:  Headache with pressure sensation described as annoying.    MEDICATIONS:  He takes losartan for blood pressure management, along with B vitamin and magnesium supplements.    LIFESTYLE:  He recently resumed daily coffee consumption of up to two cups some days. He reports a sedentary lifestyle with prolonged periods of sitting at desk and decreased physical activity compared to previous exercise routine. He consumes alcohol on weekends during social activities.  Wife reports that blood pressure always starts going back up when he starts drinking coffee again and drinking alcohol.       Review of Systems  Comprehensive review of systems negative except as stated in HPI    The patient's Health Maintenance was reviewed and the following appears to be due:   Health Maintenance Due   Topic Date Due    Colorectal Cancer Screening  01/17/2025       Past Medical History:  Past Medical History:   Diagnosis Date    Anxiety disorder, unspecified     GERD (gastroesophageal reflux disease)     Hyperlipidemia     Hypertensive disorder     Male hypogonadism      Past Surgical History:   Procedure Laterality Date    COLONOSCOPY  01/17/2020    Dr. Hernandez    FACIAL RECONSTRUCTION SURGERY       Review of patient's allergies indicates:   Allergen Reactions    Phenytoin sodium extended Hives     Medications Ordered Prior to Encounter[1]  Social History[2]  Family History   Problem Relation Name Age of Onset     "Heart block Mother      Heart block Father      Prostate cancer Father      Valvular heart disease Father         Objective:       BP (!) 181/90 (BP Location: Left arm)   Pulse 64   Resp 16   Ht 5' 7" (1.702 m)   Wt 80.7 kg (178 lb)   SpO2 99%   BMI 27.88 kg/m²      Physical Exam  Vitals and nursing note reviewed.   Constitutional:       Appearance: Normal appearance. He is normal weight.   HENT:      Head: Normocephalic and atraumatic.      Right Ear: Tympanic membrane, ear canal and external ear normal.      Left Ear: Tympanic membrane, ear canal and external ear normal.      Nose: Nose normal.      Mouth/Throat:      Mouth: Mucous membranes are moist.      Pharynx: Oropharynx is clear.   Eyes:      Extraocular Movements: Extraocular movements intact.      Conjunctiva/sclera: Conjunctivae normal.      Pupils: Pupils are equal, round, and reactive to light.   Cardiovascular:      Rate and Rhythm: Normal rate and regular rhythm.      Pulses: Normal pulses.      Heart sounds: Normal heart sounds.   Pulmonary:      Effort: Pulmonary effort is normal.      Breath sounds: Normal breath sounds.   Musculoskeletal:         General: Normal range of motion.      Cervical back: Normal range of motion and neck supple.   Skin:     General: Skin is warm and dry.   Neurological:      General: No focal deficit present.      Mental Status: He is alert and oriented to person, place, and time.   Psychiatric:         Mood and Affect: Mood normal.         Behavior: Behavior normal.         Thought Content: Thought content normal.         Judgment: Judgment normal.             Assessment and Plan     Assessment & Plan    I10 Primary hypertension    IMPRESSION:  - BP elevated from 128/77 two weeks ago to 181/90 currently.  - Potential trigger for BP spike: recent introduction of daily coffee.  - Added HCTZ to existing losartan therapy.  - Started clonidine as needed for acute management of hypertension episodes and associated " symptoms (e.g., headache).    PRIMARY HYPERTENSION:  - Explained potential link between caffeine and BP elevation.  - Patient to check BP every morning.  - Added HCTZ to existing losartan therapy.  - Started clonidine as needed for acute management of hypertension episodes and associated symptoms (e.g., headache).  - Discussed possible side effects of clonidine, including drowsiness.  - Follow up in about a month.  - Contact the office if BP remains consistently elevated or if experiencing concerning symptoms.    LIFESTYLE CHANGES:  - Recommend reducing or eliminating coffee.  - Patient to resume regular walking and exercise routine.           1. Primary hypertension  Overview:  10/28/2021 - amlodipine 5 mg daily  10/29/2021 - DC amlodipine due to dizziness, start ramipril 5 mg daily  01/31/2022 - DC ramipril due to cough, start losartan 25 mg daily  11/03/2022 - losartan 50 mg daily  05/02/2023 - decrease losartan back to 25 mg due to headache, add HCTZ 12.5 mg daily (never started HCTZ)  03/07/2024 - losartan 100 mg   06/17/2025 - add HCTZ 25 mg daily, RX for PRN clonidine    Orders:  -     hydroCHLOROthiazide (HYDRODIURIL) 25 MG tablet; Take 1 tablet (25 mg total) by mouth once daily.  Dispense: 90 tablet; Refill: 3  -     cloNIDine (CATAPRES) 0.1 MG tablet; Take 1 tablet (0.1 mg total) by mouth 2 (two) times daily as needed (Systolic BP > 160 or diastolic BP > 100).  Dispense: 30 tablet; Refill: 1  -     cloNIDine tablet 0.1 mg           Follow up in about 4 weeks (around 7/15/2025) for follow up.     This note was generated with the assistance of ambient listening technology. Verbal consent was obtained by the patient and accompanying visitor(s) for the recording of patient appointment to facilitate this note. I attest to having reviewed and edited the generated note for accuracy, though some syntax or spelling errors may persist. Please contact the author of this note for any clarification.            [1]    Current Outpatient Medications on File Prior to Visit   Medication Sig Dispense Refill    EScitalopram oxalate (LEXAPRO) 10 MG tablet Take 1 tablet (10 mg total) by mouth once daily. 90 tablet 3    losartan (COZAAR) 100 MG tablet TAKE 1 TABLET BY MOUTH EVERY DAY 90 tablet 3    testosterone cypionate (DEPOTESTOTERONE CYPIONATE) 200 mg/mL injection SMARTSI.5 Milliliter(s) IM Once a Week       No current facility-administered medications on file prior to visit.   [2]   Social History  Socioeconomic History    Marital status:    Tobacco Use    Smoking status: Never    Smokeless tobacco: Never   Substance and Sexual Activity    Alcohol use: Yes     Comment: occassional    Drug use: Never    Sexual activity: Yes     Partners: Female     Birth control/protection: None     Social Drivers of Health     Financial Resource Strain: Low Risk  (2025)    Overall Financial Resource Strain (CARDIA)     Difficulty of Paying Living Expenses: Not hard at all   Food Insecurity: No Food Insecurity (2025)    Hunger Vital Sign     Worried About Running Out of Food in the Last Year: Never true     Ran Out of Food in the Last Year: Never true   Transportation Needs: No Transportation Needs (2025)    PRAPARE - Transportation     Lack of Transportation (Medical): No     Lack of Transportation (Non-Medical): No   Physical Activity: Insufficiently Active (2025)    Exercise Vital Sign     Days of Exercise per Week: 2 days     Minutes of Exercise per Session: 20 min   Stress: Stress Concern Present (2025)    [employer] - Occupational Stress Questionnaire     Feeling of Stress : To some extent   Housing Stability: Low Risk  (2025)    Housing Stability Vital Sign     Unable to Pay for Housing in the Last Year: No     Number of Times Moved in the Last Year: 0     Homeless in the Last Year: No

## 2025-07-05 DIAGNOSIS — I10 PRIMARY HYPERTENSION: ICD-10-CM

## 2025-07-07 RX ORDER — CLONIDINE HYDROCHLORIDE 0.1 MG/1
TABLET ORAL
Qty: 60 TABLET | Refills: 0 | Status: SHIPPED | OUTPATIENT
Start: 2025-07-07

## 2025-07-10 ENCOUNTER — PATIENT MESSAGE (OUTPATIENT)
Dept: FAMILY MEDICINE | Facility: CLINIC | Age: 60
End: 2025-07-10
Payer: COMMERCIAL

## 2025-07-10 ENCOUNTER — TELEPHONE (OUTPATIENT)
Dept: FAMILY MEDICINE | Facility: CLINIC | Age: 60
End: 2025-07-10
Payer: COMMERCIAL

## 2025-07-18 ENCOUNTER — OFFICE VISIT (OUTPATIENT)
Dept: FAMILY MEDICINE | Facility: CLINIC | Age: 60
End: 2025-07-18
Payer: COMMERCIAL

## 2025-07-18 VITALS
OXYGEN SATURATION: 100 % | DIASTOLIC BLOOD PRESSURE: 86 MMHG | HEART RATE: 67 BPM | WEIGHT: 173 LBS | SYSTOLIC BLOOD PRESSURE: 138 MMHG | HEIGHT: 67 IN | RESPIRATION RATE: 16 BRPM | BODY MASS INDEX: 27.15 KG/M2

## 2025-07-18 DIAGNOSIS — I10 PRIMARY HYPERTENSION: Primary | ICD-10-CM

## 2025-07-18 DIAGNOSIS — E29.1 HYPOGONADISM IN MALE: ICD-10-CM

## 2025-07-18 DIAGNOSIS — G47.19 EXCESSIVE DAYTIME SLEEPINESS: ICD-10-CM

## 2025-07-18 DIAGNOSIS — R06.83 SNORES: ICD-10-CM

## 2025-07-18 NOTE — ASSESSMENT & PLAN NOTE
- Ordered home sleep study to evaluate for sleep apnea.  - Discussed various treatment options for sleep apnea, including CPAP, oral appliances, and Inspire implant.  - Explained process of home sleep study and potential follow-up if sleep apnea is diagnosed.  - Patient to await call from [facility] to set up sleep study and from office once sleep study results are reviewed.

## 2025-07-18 NOTE — PROGRESS NOTES
Subjective:       Patient ID: Kt Guzman is a 59 y.o. male.    Chief Complaint: Hypertension (1m fu)      History of Present Illness    CHIEF COMPLAINT:  Patient presents today for follow up of blood pressure management.    HYPERTENSION:  His blood pressure fluctuates throughout the day, ranging from 129/78 to 140/systolic. He reports blood pressure has improved and stabilized since discontinuing various supplements and testosterone, though he still experiences intermittent spikes. He continues Losartan 100mg for management.  He was taking the HCTZ at night as well but getting up to often to urinate so he stopped it.    SLEEP DISORDER:  He reports symptoms consistent with potential obstructive sleep apnea including loud snoring, episodes of breathing cessation during sleep, and tongue numbness during these events. He frequently wakes up with dry mouth and associated headaches. He experiences significant daytime fatigue requiring multiple naps throughout the day and reports terrifying dreams. He notes easy sleep onset. These symptoms have become more pronounced since discontinuing testosterone therapy.    MEDICATIONS:  He has discontinued testosterone and vitamins. He previously experienced improved energy levels with testosterone injections, particularly with transdermal administration, though this coincided with sleep disturbances. He is currently monitoring the impact of testosterone discontinuation on his blood pressure.        Review of Systems  Comprehensive review of systems negative except as stated in HPI    The patient's Health Maintenance was reviewed and the following appears to be due:   Health Maintenance Due   Topic Date Due    Colorectal Cancer Screening  01/17/2025       Past Medical History:  Past Medical History:   Diagnosis Date    Anxiety disorder, unspecified     GERD (gastroesophageal reflux disease)     Hyperlipidemia     Hypertensive disorder     Male hypogonadism      Past Surgical  "History:   Procedure Laterality Date    COLONOSCOPY  01/17/2020    Dr. Hernandez    FACIAL RECONSTRUCTION SURGERY       Review of patient's allergies indicates:   Allergen Reactions    Phenytoin sodium extended Hives     Medications Ordered Prior to Encounter[1]  Social History[2]  Family History   Problem Relation Name Age of Onset    Heart block Mother      Heart block Father      Prostate cancer Father      Valvular heart disease Father         Objective:       /86 (BP Location: Right arm)   Pulse 67   Resp 16   Ht 5' 7" (1.702 m)   Wt 78.5 kg (173 lb)   SpO2 100%   BMI 27.10 kg/m²      Physical Exam  Vitals and nursing note reviewed.   Constitutional:       Appearance: Normal appearance. He is normal weight.   HENT:      Head: Normocephalic and atraumatic.      Right Ear: Tympanic membrane, ear canal and external ear normal.      Left Ear: Tympanic membrane, ear canal and external ear normal.      Nose: Nose normal.      Mouth/Throat:      Mouth: Mucous membranes are moist.      Pharynx: Oropharynx is clear.   Eyes:      Extraocular Movements: Extraocular movements intact.      Conjunctiva/sclera: Conjunctivae normal.      Pupils: Pupils are equal, round, and reactive to light.   Cardiovascular:      Rate and Rhythm: Normal rate and regular rhythm.      Pulses: Normal pulses.      Heart sounds: Normal heart sounds.   Pulmonary:      Effort: Pulmonary effort is normal.      Breath sounds: Normal breath sounds.   Musculoskeletal:         General: Normal range of motion.      Cervical back: Normal range of motion and neck supple.   Skin:     General: Skin is warm and dry.   Neurological:      General: No focal deficit present.      Mental Status: He is alert and oriented to person, place, and time.   Psychiatric:         Mood and Affect: Mood normal.         Behavior: Behavior normal.         Thought Content: Thought content normal.         Judgment: Judgment normal.             Assessment and Plan "     Assessment & Plan    I10 Primary hypertension  R06.83 Snores  G47.19 Excessive daytime sleepiness    IMPRESSION:  - Assessed BP control, noting it remains elevated at 138/86 despite current losartan regimen.  - Suspect untreated sleep apnea as contributing factor to HTN and fatigue.  - Considered impact of testosterone therapy on BP, recommending temporary discontinuation.  - Plan to reassess BP control and fatigue after addressing potential sleep apnea and adjusting medications.    PRIMARY HYPERTENSION:  - Started HCTZ to be taken in the morning along with current losartan 100 mg.  - Discontinued testosterone therapy temporarily to assess impact on BP.  - Explained relationship between untreated sleep apnea and HTN.  - Follow up in 3 months to reassess BP control and evaluate effectiveness of sleep apnea treatment if diagnosed.    SNORES:  - Ordered home sleep study to evaluate for sleep apnea.  - Discussed various treatment options for sleep apnea, including CPAP, oral appliances, and Inspire implant.  - Explained process of home sleep study and potential follow-up if sleep apnea is diagnosed.  - Patient to await call from [facility] to set up sleep study and from office once sleep study results are reviewed.    EXCESSIVE DAYTIME SLEEPINESS:  - Likely related to potential sleep apnea.  - Management will be guided by sleep study results as outlined in the SNORES section above.           1. Primary hypertension  Overview:  10/28/2021 - amlodipine 5 mg daily  10/29/2021 - DC amlodipine due to dizziness, start ramipril 5 mg daily  01/31/2022 - DC ramipril due to cough, start losartan 25 mg daily  11/03/2022 - losartan 50 mg daily  05/02/2023 - decrease losartan back to 25 mg due to headache, add HCTZ 12.5 mg daily (never started HCTZ)  03/07/2024 - losartan 100 mg   06/17/2025 - add HCTZ 25 mg daily, RX for PRN clonidine      2. Snores    3. Excessive daytime sleepiness    4. Hypogonadism in  male  Overview:  Managed by Dr. Crook, on Depo testosterone 200 mg/ml 0.5 mL IM weekly    Assessment & Plan:  - Considered impact of testosterone therapy on BP, recommending temporary discontinuation.               Follow up in about 3 months (around 10/18/2025) for follow up.     This note was generated with the assistance of ambient listening technology. Verbal consent was obtained by the patient and accompanying visitor(s) for the recording of patient appointment to facilitate this note. I attest to having reviewed and edited the generated note for accuracy, though some syntax or spelling errors may persist. Please contact the author of this note for any clarification.            [1]   Current Outpatient Medications on File Prior to Visit   Medication Sig Dispense Refill    cloNIDine (CATAPRES) 0.1 MG tablet TAKE 1 TABLET BY MOUTH TWICE A DAY AS NEEDED (SYSTOLIC BP>160 OR DIASTOLIC BP >100) 60 tablet 0    EScitalopram oxalate (LEXAPRO) 10 MG tablet Take 1 tablet (10 mg total) by mouth once daily. 90 tablet 3    hydroCHLOROthiazide (HYDRODIURIL) 25 MG tablet Take 1 tablet (25 mg total) by mouth once daily. 90 tablet 3    losartan (COZAAR) 100 MG tablet TAKE 1 TABLET BY MOUTH EVERY DAY 90 tablet 3    [DISCONTINUED] testosterone cypionate (DEPOTESTOTERONE CYPIONATE) 200 mg/mL injection SMARTSI.5 Milliliter(s) IM Once a Week       No current facility-administered medications on file prior to visit.   [2]   Social History  Socioeconomic History    Marital status:    Tobacco Use    Smoking status: Never    Smokeless tobacco: Never   Substance and Sexual Activity    Alcohol use: Yes     Comment: occassional    Drug use: Never    Sexual activity: Yes     Partners: Female     Birth control/protection: None     Social Drivers of Health     Financial Resource Strain: Low Risk  (2025)    Overall Financial Resource Strain (CARDIA)     Difficulty of Paying Living Expenses: Not hard at all   Food Insecurity:  No Food Insecurity (4/16/2025)    Hunger Vital Sign     Worried About Running Out of Food in the Last Year: Never true     Ran Out of Food in the Last Year: Never true   Transportation Needs: No Transportation Needs (4/16/2025)    PRAPARE - Transportation     Lack of Transportation (Medical): No     Lack of Transportation (Non-Medical): No   Physical Activity: Insufficiently Active (4/16/2025)    Exercise Vital Sign     Days of Exercise per Week: 2 days     Minutes of Exercise per Session: 20 min   Stress: Stress Concern Present (4/16/2025)    [employer] - Occupational Stress Questionnaire     Feeling of Stress : To some extent   Housing Stability: Low Risk  (4/16/2025)    Housing Stability Vital Sign     Unable to Pay for Housing in the Last Year: No     Number of Times Moved in the Last Year: 0     Homeless in the Last Year: No

## 2025-07-18 NOTE — ASSESSMENT & PLAN NOTE
- Likely related to potential sleep apnea.  - Management will be guided by sleep study results as outlined in the SNORES section above.

## 2025-07-18 NOTE — ASSESSMENT & PLAN NOTE
- Started HCTZ to be taken in the morning along with current losartan 100 mg.  - Discontinued testosterone therapy temporarily to assess impact on BP.  - Explained relationship between untreated sleep apnea and HTN.  - Follow up in 3 months to reassess BP control and evaluate effectiveness of sleep apnea treatment if diagnosed.

## 2025-07-23 ENCOUNTER — TELEPHONE (OUTPATIENT)
Dept: FAMILY MEDICINE | Facility: CLINIC | Age: 60
End: 2025-07-23
Payer: COMMERCIAL

## 2025-07-23 DIAGNOSIS — G47.19 EXCESSIVE DAYTIME SLEEPINESS: ICD-10-CM

## 2025-07-23 DIAGNOSIS — R06.83 SNORES: Primary | ICD-10-CM

## 2025-07-23 NOTE — TELEPHONE ENCOUNTER
Copied from CRM #[id number]. Topic: General Inquiry - Patient Advice  >> Jul 23, 2025 12:30 PM Tosha wrote:  .Who Called: Kt Guzman    Caller is requesting assistance/information from provider's office.    Symptoms (please be specific): pt states that the place for the sleep study is out of the pt's insurance network    How long has patient had these symptoms:  n/a   List of preferred pharmacies on file (remove unneeded): [unfilled]  If different, enter pharmacy into here including location and phone number: n/a       Preferred Method of Contact: Phone Call  Patient's Preferred Phone Number on File: [phone]   Best Call Back Number, if different:  Additional Information: please contact the pt with other recommendations

## 2025-07-24 ENCOUNTER — TELEPHONE (OUTPATIENT)
Dept: FAMILY MEDICINE | Facility: CLINIC | Age: 60
End: 2025-07-24
Payer: COMMERCIAL

## 2025-07-24 DIAGNOSIS — G47.19 EXCESSIVE DAYTIME SLEEPINESS: ICD-10-CM

## 2025-07-24 DIAGNOSIS — R06.83 SNORES: Primary | ICD-10-CM

## 2025-07-24 NOTE — TELEPHONE ENCOUNTER
Spoke with patient, he stated Dr Tse's office called him and told him he would have to go in for a consultation and then do sleep study over there, he thought he was going to be able to do this at home

## 2025-07-24 NOTE — TELEPHONE ENCOUNTER
Copied from CRM #[id number]. Topic: General Inquiry - Patient Advice  >> Jul 24, 2025  3:21 PM Charles wrote:  Who Called: Kt Guzman    Caller is requesting assistance/information from provider's office.    Symptoms (please be specific):    How long has patient had these symptoms:    List of preferred pharmacies on file (remove unneeded): [unfilled]  If different, enter pharmacy into here including location and phone number:       Preferred Method of Contact: Phone Call  Patient's Preferred Phone Number on File: [phone]   Best Call Back Number, if different:  Additional Information: pt called to speak with nurse regarding cpap appointment pls advise

## 2025-07-24 NOTE — TELEPHONE ENCOUNTER
Did a secure chat with 1 of the employees at [facility], I had the order wrong, she fixed everything and he will qualify for a direct home sleep study.  They will call him to set it up.   no

## 2025-07-25 ENCOUNTER — TELEPHONE (OUTPATIENT)
Dept: SLEEP MEDICINE | Facility: HOSPITAL | Age: 60
End: 2025-07-25
Payer: COMMERCIAL

## 2025-07-25 DIAGNOSIS — G47.19 EXCESSIVE DAYTIME SLEEPINESS: Primary | ICD-10-CM

## 2025-07-25 DIAGNOSIS — G47.33 OBSTRUCTIVE SLEEP APNEA: ICD-10-CM

## 2025-07-25 DIAGNOSIS — R06.83 SNORES: ICD-10-CM

## 2025-07-25 NOTE — TELEPHONE ENCOUNTER
"  Reviewed last office note from [facility]:    "He reports symptoms consistent with potential obstructive sleep apnea including loud snoring, episodes of breathing cessation during sleep, and tongue numbness during these events. He frequently wakes up with dry mouth and associated headaches. He experiences significant daytime fatigue requiring multiple naps throughout the day and reports terrifying dreams. He notes easy sleep onset. These symptoms have become more pronounced since discontinuing testosterone therapy"    Agree with HST; will sign order    "

## 2025-08-04 ENCOUNTER — TELEPHONE (OUTPATIENT)
Dept: SLEEP MEDICINE | Facility: HOSPITAL | Age: 60
End: 2025-08-04
Payer: COMMERCIAL

## 2025-08-04 DIAGNOSIS — R06.83 SNORING: ICD-10-CM

## 2025-08-04 DIAGNOSIS — G47.33 OBSTRUCTIVE SLEEP APNEA: Primary | ICD-10-CM

## 2025-08-04 NOTE — TELEPHONE ENCOUNTER
"  Reviewed last office note from [facility] from 7/18/25  :  "He reports symptoms consistent with potential obstructive sleep apnea including loud snoring, episodes of breathing cessation during sleep, and tongue numbness during these events. He frequently wakes up with dry mouth and associated headaches. He experiences significant daytime fatigue requiring multiple naps throughout the day and reports terrifying dreams. He notes easy sleep onset. These symptoms have become more pronounced since discontinuing testosterone therapy."    Agree with HST; will sign order      "

## 2025-08-09 DIAGNOSIS — I10 PRIMARY HYPERTENSION: ICD-10-CM

## 2025-08-11 RX ORDER — CLONIDINE HYDROCHLORIDE 0.1 MG/1
TABLET ORAL
Qty: 180 TABLET | Refills: 1 | Status: SHIPPED | OUTPATIENT
Start: 2025-08-11

## 2025-08-22 ENCOUNTER — TELEPHONE (OUTPATIENT)
Dept: FAMILY MEDICINE | Facility: CLINIC | Age: 60
End: 2025-08-22
Payer: COMMERCIAL

## 2025-08-22 DIAGNOSIS — G47.33 OSA (OBSTRUCTIVE SLEEP APNEA): Primary | ICD-10-CM

## 2025-08-22 PROBLEM — R06.83 SNORES: Status: RESOLVED | Noted: 2025-07-18 | Resolved: 2025-08-22

## 2025-09-02 ENCOUNTER — TELEPHONE (OUTPATIENT)
Dept: FAMILY MEDICINE | Facility: CLINIC | Age: 60
End: 2025-09-02
Payer: COMMERCIAL